# Patient Record
Sex: FEMALE | Race: WHITE | NOT HISPANIC OR LATINO | Employment: OTHER | URBAN - METROPOLITAN AREA
[De-identification: names, ages, dates, MRNs, and addresses within clinical notes are randomized per-mention and may not be internally consistent; named-entity substitution may affect disease eponyms.]

---

## 2019-11-04 LAB
AMPHETAMINE SCREEN, URINE: NORMAL
BARBITURATE SCREEN, URINE: NORMAL
BENZODIAZEPINE SCREEN, URINE: NORMAL
CANNABINOID SCREEN URINE: NORMAL
COCAINE METABOLITE SCREEN URINE: NORMAL
Lab: NORMAL
METHADONE SCREEN, URINE: NORMAL
OPIATE SCREEN, URINE: NORMAL
PHENCYCLIDINE SCREEN URINE: NORMAL

## 2019-11-08 LAB
6-ACETYLMORPHINE, UR: <25 NG/ML
7-AMINOCLONAZEPAM, URINE: <25 NG/ML
ALPHA-HYDROXYALPRAZOLAM, URINE: <25 NG/ML
ALPHA-HYDROXYMIDAZOLAM, URINE: <25 NG/ML
ALPRAZOLAM URINE QUANT: <25 NG/ML
BUPRENORPHINE GLUCURONIDE URINE: <5 NG/ML
BUPRENORPHINE URINE: <2 NG/ML
CHLORDIAZEPOXIDE, URINE: <25 NG/ML
CLONAZEPAM, URINE CONFIRM: <25 NG/ML
CODEINE URINE: <25 NG/ML
DIAZEPAM URINE QUANT: <25 NG/ML
FENTANYL, UR CONF: <2.5 NG/ML
HYDROCODONE, URINE: 83 NG/ML
HYDROMORPHONE, URINE: <25 NG/ML
LORAZEPAM URINE QUANT: <25 NG/ML
MIDAZOLAM URINE QUANT: <25 NG/ML
MORPHINE URINE: <25 NG/ML
N-DESMETHYLTAPENTADOL, URINE: <25 NG/ML
NALOXONE URINE: <100 NG/ML
NORBUPRENORPHINE GLUCURONIDE URINE: <5 NG/ML
NORBUPRENORPHINE, URINE: <2 NG/ML
NORDIAZEPAM URINE QUANT: <25 NG/ML
NORFENTANYL, UR CONF: <2.5 NG/ML
NORHYDROCODONE, URINE: 46 NG/ML
NOROXYCODONE, URINE: <25 NG/ML
O-DESMETHYLTRAMADOL,UR: <25 NG/ML
OXAZEPAM URINE QUANT: <25 NG/ML
OXYCODONE URINE: <25 NG/ML
OXYMORPHONE, URINE: <25 NG/ML
TAPENTADOL, URINE: <25 NG/ML
TEMAZEPAM URINE QUANT: <25 NG/ML
TRAMADOL, UR GC/MS CONF: <25 NG/ML

## 2020-01-06 LAB
6-ACETYLMORPHINE, UR: <25 NG/ML
7-AMINOCLONAZEPAM, URINE: <25 NG/ML
ALPHA-HYDROXYALPRAZOLAM, URINE: <25 NG/ML
ALPHA-HYDROXYMIDAZOLAM, URINE: <25 NG/ML
ALPRAZOLAM URINE QUANT: <25 NG/ML
AMPHETAMINE SCREEN, URINE: ABNORMAL
BARBITURATE SCREEN, URINE: ABNORMAL
CANNABINOID SCREEN URINE: ABNORMAL
CHLORDIAZEPOXIDE, URINE: <25 NG/ML
CLONAZEPAM, URINE CONFIRM: <25 NG/ML
COCAINE METABOLITE SCREEN URINE: ABNORMAL
CODEINE URINE: <25 NG/ML
CREAT SERPL-MCNC: 120.1 MG/DL
DIAZEPAM URINE QUANT: <25 NG/ML
EDDP, URINE: <25 NG/ML
FENTANYL, UR CONF: <2.5 NG/ML
HYDROCODONE, URINE: 2240 NG/ML
HYDROMORPHONE, URINE: 559 NG/ML
LORAZEPAM URINE QUANT: <25 NG/ML
Lab: ABNORMAL
METHADONE, URINE: <25 NG/ML
MIDAZOLAM URINE QUANT: <25 NG/ML
MORPHINE URINE: <25 NG/ML
NORDIAZEPAM URINE QUANT: <25 NG/ML
NORFENTANYL, UR CONF: <2.5 NG/ML
NORHYDROCODONE, URINE: >1000 NG/ML
NOROXYCODONE, URINE: <25 NG/ML
O-DESMETHYLTRAMADOL,UR: <25 NG/ML
OXAZEPAM URINE QUANT: <25 NG/ML
OXYCODONE URINE: <25 NG/ML
OXYMORPHONE, URINE: <25 NG/ML
PHENCYCLIDINE SCREEN URINE: ABNORMAL
TEMAZEPAM URINE QUANT: <25 NG/ML
TRAMADOL, UR GC/MS CONF: <25 NG/ML
ZOLPIDEM METABOLITE (ZCA), URINE: <25 NG/ML
ZOLPIDEM, URINE: <25 NG/ML

## 2023-05-23 PROBLEM — G89.29 ABDOMINAL PAIN, CHRONIC, LEFT LOWER QUADRANT: Status: ACTIVE | Noted: 2023-05-23

## 2023-05-23 PROBLEM — I10 BENIGN ESSENTIAL HYPERTENSION: Status: ACTIVE | Noted: 2023-05-23

## 2023-05-23 PROBLEM — B02.9 SHINGLES: Status: ACTIVE | Noted: 2023-05-23

## 2023-05-23 PROBLEM — H53.8 BLURRED VISION: Status: ACTIVE | Noted: 2023-05-23

## 2023-05-23 PROBLEM — R29.898 LEFT ARM WEAKNESS: Status: ACTIVE | Noted: 2023-05-23

## 2023-05-23 PROBLEM — M75.42 IMPINGEMENT SYNDROME OF LEFT SHOULDER: Status: ACTIVE | Noted: 2023-05-23

## 2023-05-23 PROBLEM — G47.00 INSOMNIA: Status: ACTIVE | Noted: 2023-05-23

## 2023-05-23 PROBLEM — R26.89 BALANCE PROBLEM: Status: ACTIVE | Noted: 2023-05-23

## 2023-05-23 PROBLEM — F41.9 ANXIETY AND DEPRESSION: Status: ACTIVE | Noted: 2023-05-23

## 2023-05-23 PROBLEM — M62.838 MUSCLE SPASM: Status: ACTIVE | Noted: 2023-05-23

## 2023-05-23 PROBLEM — H10.13 ALLERGIC CONJUNCTIVITIS OF BOTH EYES: Status: ACTIVE | Noted: 2023-05-23

## 2023-05-23 PROBLEM — E55.9 VITAMIN D DEFICIENCY: Status: ACTIVE | Noted: 2023-05-23

## 2023-05-23 PROBLEM — M19.90 ARTHRITIS: Status: ACTIVE | Noted: 2023-05-23

## 2023-05-23 PROBLEM — R07.9 CHEST PAIN: Status: ACTIVE | Noted: 2023-05-23

## 2023-05-23 PROBLEM — G62.9 PERIPHERAL NEUROPATHY: Status: ACTIVE | Noted: 2023-05-23

## 2023-05-23 PROBLEM — R73.9 HYPERGLYCEMIA: Status: ACTIVE | Noted: 2023-05-23

## 2023-05-23 PROBLEM — R94.31 ABNORMAL EKG: Status: ACTIVE | Noted: 2023-05-23

## 2023-05-23 PROBLEM — G43.909 HEADACHE, MIGRAINE: Status: ACTIVE | Noted: 2023-05-23

## 2023-05-23 PROBLEM — H00.019 STYE: Status: ACTIVE | Noted: 2023-05-23

## 2023-05-23 PROBLEM — S92.034A CLOSED NONDISPLACED AVULSION FRACTURE OF TUBEROSITY OF RIGHT CALCANEUS: Status: ACTIVE | Noted: 2023-05-23

## 2023-05-23 PROBLEM — R10.32 ABDOMINAL PAIN, CHRONIC, LEFT LOWER QUADRANT: Status: ACTIVE | Noted: 2023-05-23

## 2023-05-23 PROBLEM — M43.06 LUMBAR SPONDYLOLYSIS: Status: ACTIVE | Noted: 2023-05-23

## 2023-05-23 PROBLEM — G35 MULTIPLE SCLEROSIS, RELAPSING-REMITTING (MULTI): Status: ACTIVE | Noted: 2023-05-23

## 2023-05-23 PROBLEM — T63.91XA ALLERGY OR TOXIC REACTION TO VENOM: Status: ACTIVE | Noted: 2023-05-23

## 2023-05-23 PROBLEM — M75.82 ROTATOR CUFF TENDINITIS, LEFT: Status: ACTIVE | Noted: 2023-05-23

## 2023-05-23 PROBLEM — G47.30 APNEA, SLEEP: Status: ACTIVE | Noted: 2023-05-23

## 2023-05-23 PROBLEM — E11.65 TYPE 2 DIABETES MELLITUS WITH HYPERGLYCEMIA, WITHOUT LONG-TERM CURRENT USE OF INSULIN (MULTI): Status: ACTIVE | Noted: 2023-05-23

## 2023-05-23 PROBLEM — M77.11 LATERAL EPICONDYLITIS OF RIGHT ELBOW: Status: ACTIVE | Noted: 2023-05-23

## 2023-05-23 PROBLEM — T75.3XXA MOTION SICKNESS: Status: ACTIVE | Noted: 2023-05-23

## 2023-05-23 PROBLEM — R13.10 DYSPHAGIA: Status: ACTIVE | Noted: 2023-05-23

## 2023-05-23 PROBLEM — J30.9 ALLERGIC RHINITIS: Status: ACTIVE | Noted: 2023-05-23

## 2023-05-23 PROBLEM — B35.6 TINEA CRURIS: Status: ACTIVE | Noted: 2023-05-23

## 2023-05-23 PROBLEM — M75.52 BURSITIS OF LEFT SHOULDER: Status: ACTIVE | Noted: 2023-05-23

## 2023-05-23 PROBLEM — K21.9 GERD WITHOUT ESOPHAGITIS: Status: ACTIVE | Noted: 2023-05-23

## 2023-05-23 PROBLEM — F17.210 CIGARETTE NICOTINE DEPENDENCE WITHOUT COMPLICATION: Status: ACTIVE | Noted: 2023-05-23

## 2023-05-23 PROBLEM — F32.A ANXIETY AND DEPRESSION: Status: ACTIVE | Noted: 2023-05-23

## 2023-05-23 PROBLEM — M19.012 ARTHRITIS OF LEFT ACROMIOCLAVICULAR JOINT: Status: ACTIVE | Noted: 2023-05-23

## 2023-05-23 PROBLEM — F45.8 FUNCTIONAL VISUAL LOSS: Status: ACTIVE | Noted: 2023-05-23

## 2023-05-23 PROBLEM — R20.9 DISTURBANCE OF SKIN SENSATION: Status: ACTIVE | Noted: 2023-05-23

## 2023-05-23 PROBLEM — R91.1 SOLITARY PULMONARY NODULE: Status: ACTIVE | Noted: 2023-05-23

## 2023-05-23 PROBLEM — M75.22 TENDINITIS OF UPPER BICEPS TENDON OF LEFT SHOULDER: Status: ACTIVE | Noted: 2023-05-23

## 2023-05-23 PROBLEM — S53.401A ELBOW SPRAIN, RIGHT, INITIAL ENCOUNTER: Status: ACTIVE | Noted: 2023-05-23

## 2023-05-23 PROBLEM — M54.16 LUMBAR RADICULOPATHY: Status: ACTIVE | Noted: 2023-05-23

## 2023-05-23 PROBLEM — M54.50 ACUTE EXACERBATION OF CHRONIC LOW BACK PAIN: Status: ACTIVE | Noted: 2023-05-23

## 2023-05-23 PROBLEM — K59.09 CHRONIC CONSTIPATION: Status: ACTIVE | Noted: 2023-05-23

## 2023-05-23 PROBLEM — R29.6 FALLS: Status: ACTIVE | Noted: 2023-05-23
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
## 2023-05-23 PROBLEM — E78.2 COMBINED HYPERLIPIDEMIA: Status: ACTIVE | Noted: 2023-05-23

## 2023-05-23 PROBLEM — M76.32 ILIOTIBIAL BAND SYNDROME OF LEFT SIDE: Status: ACTIVE | Noted: 2023-05-23

## 2023-05-23 PROBLEM — R53.81 PHYSICAL DEBILITY: Status: ACTIVE | Noted: 2023-05-23

## 2023-05-23 PROBLEM — G89.29 ACUTE EXACERBATION OF CHRONIC LOW BACK PAIN: Status: ACTIVE | Noted: 2023-05-23

## 2023-05-23 PROBLEM — M75.122 NONTRAUMATIC COMPLETE TEAR OF LEFT ROTATOR CUFF: Status: ACTIVE | Noted: 2023-05-23

## 2023-05-23 PROBLEM — M25.521 ELBOW PAIN, RIGHT: Status: ACTIVE | Noted: 2023-05-23

## 2023-05-23 PROBLEM — R94.39 ABNORMAL STRESS ELECTROCARDIOGRAM TEST: Status: ACTIVE | Noted: 2023-05-23

## 2023-05-23 PROBLEM — H00.025 HORDEOLUM INTERNUM OF LEFT LOWER EYELID: Status: ACTIVE | Noted: 2023-05-23

## 2023-05-23 PROBLEM — H57.89 EYE SWELLING, LEFT: Status: ACTIVE | Noted: 2023-05-23

## 2023-05-23 PROBLEM — E03.9 HYPOTHYROIDISM: Status: ACTIVE | Noted: 2023-05-23

## 2023-05-23 PROBLEM — H65.91 RIGHT SEROUS OTITIS MEDIA: Status: ACTIVE | Noted: 2023-05-23

## 2023-05-23 PROBLEM — M75.102 ROTATOR CUFF TEAR, LEFT: Status: ACTIVE | Noted: 2023-05-23

## 2023-10-24 DIAGNOSIS — M25.522 LEFT ELBOW PAIN: ICD-10-CM

## 2023-11-01 ENCOUNTER — LAB (OUTPATIENT)
Dept: LAB | Facility: LAB | Age: 48
End: 2023-11-01
Payer: COMMERCIAL

## 2023-11-01 ENCOUNTER — HOSPITAL ENCOUNTER (OUTPATIENT)
Dept: RADIOLOGY | Facility: HOSPITAL | Age: 48
Discharge: HOME | End: 2023-11-01
Payer: COMMERCIAL

## 2023-11-01 PROBLEM — K61.1 PERIRECTAL ABSCESS: Status: ACTIVE | Noted: 2023-11-01

## 2023-11-01 PROBLEM — G40.909 EPILEPSY, UNSPECIFIED, NOT INTRACTABLE, WITHOUT STATUS EPILEPTICUS (MULTI): Status: ACTIVE | Noted: 2020-09-09

## 2023-11-01 PROBLEM — F32.A: Status: ACTIVE | Noted: 2023-11-01

## 2023-11-01 PROBLEM — G35: Status: ACTIVE | Noted: 2023-11-01

## 2023-11-01 PROBLEM — F31.9 BIPOLAR DISORDER (MULTI): Status: ACTIVE | Noted: 2020-09-09

## 2023-11-01 PROBLEM — M47.26 OTHER SPONDYLOSIS WITH RADICULOPATHY, LUMBAR REGION: Status: ACTIVE | Noted: 2020-09-09

## 2023-11-01 PROBLEM — M51.379 DEGENERATION OF LUMBOSACRAL INTERVERTEBRAL DISC: Status: ACTIVE | Noted: 2023-11-01

## 2023-11-01 PROBLEM — M54.2 CERVICALGIA: Status: ACTIVE | Noted: 2020-09-09

## 2023-11-01 PROBLEM — G81.91 RIGHT HEMIPARESIS (MULTI): Status: ACTIVE | Noted: 2023-11-01

## 2023-11-01 PROBLEM — D84.9 IMMUNOCOMPROMISED, ACQUIRED (MULTI): Status: ACTIVE | Noted: 2023-11-01

## 2023-11-01 PROBLEM — F41.9 ANXIETY DISORDER, UNSPECIFIED: Status: ACTIVE | Noted: 2020-09-09

## 2023-11-01 PROBLEM — M79.7 FIBROMYALGIA: Status: ACTIVE | Noted: 2020-09-09

## 2023-11-01 PROBLEM — G62.9 POLYNEUROPATHY: Status: ACTIVE | Noted: 2020-09-09

## 2023-11-01 PROBLEM — D72.829 LEUKOCYTOSIS: Status: ACTIVE | Noted: 2023-11-01

## 2023-11-01 PROBLEM — M25.519 SHOULDER JOINT PAIN: Status: ACTIVE | Noted: 2023-11-01

## 2023-11-01 PROBLEM — H46.9 OPTIC NEURITIS: Status: ACTIVE | Noted: 2020-08-27

## 2023-11-01 PROBLEM — E03.9 HYPOTHYROIDISM, UNSPECIFIED: Status: ACTIVE | Noted: 2020-09-09

## 2023-11-01 PROBLEM — M51.37 DEGENERATION OF LUMBOSACRAL INTERVERTEBRAL DISC: Status: ACTIVE | Noted: 2023-11-01

## 2023-11-01 PROBLEM — I10 HYPERTENSIVE DISORDER: Status: ACTIVE | Noted: 2023-11-01

## 2023-11-01 PROBLEM — J45.909 ASTHMA (HHS-HCC): Status: ACTIVE | Noted: 2020-09-09

## 2023-11-01 PROBLEM — K52.9 GASTROENTERITIS: Status: ACTIVE | Noted: 2023-11-01

## 2023-11-01 PROBLEM — M47.817 LUMBOSACRAL SPONDYLOSIS WITHOUT MYELOPATHY: Status: ACTIVE | Noted: 2023-11-01

## 2023-11-01 PROBLEM — M51.9 INTERVERTEBRAL DISC DISORDER: Status: ACTIVE | Noted: 2020-09-09

## 2023-11-01 PROBLEM — I10 ESSENTIAL (PRIMARY) HYPERTENSION: Status: ACTIVE | Noted: 2020-09-09

## 2023-11-01 PROBLEM — R11.0 NAUSEA: Status: ACTIVE | Noted: 2023-11-01

## 2023-11-01 PROBLEM — H54.7 VISUAL LOSS: Status: ACTIVE | Noted: 2020-08-27

## 2023-11-01 PROBLEM — M19.90 OSTEOARTHRITIS: Status: ACTIVE | Noted: 2020-09-09

## 2023-11-01 PROBLEM — M21.371 RIGHT FOOT DROP: Status: ACTIVE | Noted: 2020-09-09

## 2023-11-01 PROBLEM — G47.30 SLEEP APNEA: Status: ACTIVE | Noted: 2020-09-09

## 2023-11-01 PROBLEM — Z91.81 HISTORY OF FALLING: Status: ACTIVE | Noted: 2020-09-09

## 2023-11-01 PROBLEM — Z79.891 LONG TERM (CURRENT) USE OF OPIATE ANALGESIC: Status: ACTIVE | Noted: 2020-09-09

## 2023-11-01 PROBLEM — G43.909 MIGRAINE, UNSPECIFIED, NOT INTRACTABLE, WITHOUT STATUS MIGRAINOSUS: Status: ACTIVE | Noted: 2020-09-09

## 2023-11-01 PROCEDURE — 73080 X-RAY EXAM OF ELBOW: CPT | Mod: LEFT SIDE | Performed by: RADIOLOGY

## 2023-11-01 PROCEDURE — 73080 X-RAY EXAM OF ELBOW: CPT | Mod: LT,FY

## 2023-11-01 RX ORDER — FINGOLIMOD HYDROCHLORIDE 0.5 MG/1
0.5 CAPSULE ORAL
COMMUNITY
Start: 2017-01-06 | End: 2023-11-08 | Stop reason: ALTCHOICE

## 2023-11-01 RX ORDER — ARIPIPRAZOLE 5 MG/1
5 TABLET ORAL NIGHTLY
COMMUNITY
End: 2024-05-21 | Stop reason: WASHOUT

## 2023-11-01 RX ORDER — CLONAZEPAM 0.5 MG/1
0.5 TABLET ORAL
COMMUNITY
Start: 2017-01-06 | End: 2023-11-02 | Stop reason: ALTCHOICE

## 2023-11-01 RX ORDER — TRAZODONE HYDROCHLORIDE 50 MG/1
1 TABLET ORAL DAILY
COMMUNITY

## 2023-11-01 RX ORDER — IBUPROFEN 200 MG
1 TABLET ORAL DAILY
COMMUNITY
End: 2023-11-02 | Stop reason: WASHOUT

## 2023-11-01 RX ORDER — PROMETHAZINE HYDROCHLORIDE 25 MG/1
25 TABLET ORAL 4 TIMES DAILY PRN
COMMUNITY
Start: 2023-07-27 | End: 2023-11-02 | Stop reason: WASHOUT

## 2023-11-01 RX ORDER — LEVOFLOXACIN 500 MG/1
500 TABLET, FILM COATED ORAL
COMMUNITY
End: 2023-11-02 | Stop reason: WASHOUT

## 2023-11-01 RX ORDER — OXYMORPHONE HYDROCHLORIDE 15 MG/1
TABLET, FILM COATED, EXTENDED RELEASE ORAL 2 TIMES DAILY
COMMUNITY
End: 2023-11-08 | Stop reason: ALTCHOICE

## 2023-11-01 RX ORDER — ZOLPIDEM TARTRATE 5 MG/1
5 TABLET ORAL NIGHTLY
COMMUNITY
Start: 2020-09-04 | End: 2023-11-02 | Stop reason: WASHOUT

## 2023-11-01 RX ORDER — OMEPRAZOLE 20 MG/1
20 CAPSULE, DELAYED RELEASE ORAL DAILY
COMMUNITY
Start: 2009-10-06 | End: 2023-11-02 | Stop reason: WASHOUT

## 2023-11-01 RX ORDER — PEN NEEDLE, DIABETIC 30 GX3/16"
NEEDLE, DISPOSABLE MISCELLANEOUS
COMMUNITY
End: 2023-11-02 | Stop reason: WASHOUT

## 2023-11-01 RX ORDER — TALC
6 POWDER (GRAM) TOPICAL NIGHTLY
COMMUNITY
Start: 2020-09-08 | End: 2023-11-08 | Stop reason: ALTCHOICE

## 2023-11-01 RX ORDER — DULOXETIN HYDROCHLORIDE 60 MG/1
60 CAPSULE, DELAYED RELEASE ORAL DAILY
COMMUNITY
End: 2024-05-21 | Stop reason: SDUPTHER

## 2023-11-01 RX ORDER — OXYCODONE AND ACETAMINOPHEN 5; 325 MG/1; MG/1
TABLET ORAL
COMMUNITY
End: 2023-11-02 | Stop reason: WASHOUT

## 2023-11-01 RX ORDER — ZIPRASIDONE HYDROCHLORIDE 20 MG/1
20 CAPSULE ORAL DAILY
COMMUNITY
End: 2023-11-02 | Stop reason: WASHOUT

## 2023-11-01 RX ORDER — VENLAFAXINE HYDROCHLORIDE 150 MG/1
150 CAPSULE, EXTENDED RELEASE ORAL
COMMUNITY
Start: 2017-01-06 | End: 2023-11-02 | Stop reason: WASHOUT

## 2023-11-01 RX ORDER — TOPIRAMATE 50 MG/1
75 TABLET, FILM COATED ORAL 2 TIMES DAILY
COMMUNITY
Start: 2020-09-08 | End: 2023-11-08 | Stop reason: ALTCHOICE

## 2023-11-01 RX ORDER — CLONAZEPAM 1 MG/1
1 TABLET ORAL DAILY
COMMUNITY
End: 2023-11-08 | Stop reason: ALTCHOICE

## 2023-11-01 RX ORDER — CIPROFLOXACIN 500 MG/1
500 TABLET ORAL 2 TIMES DAILY
COMMUNITY
End: 2023-11-08 | Stop reason: ALTCHOICE

## 2023-11-01 RX ORDER — TOPIRAMATE 100 MG/1
100 TABLET, FILM COATED ORAL 2 TIMES DAILY
COMMUNITY
End: 2023-11-08 | Stop reason: ALTCHOICE

## 2023-11-01 RX ORDER — METRONIDAZOLE 500 MG/1
500 TABLET ORAL 3 TIMES DAILY
COMMUNITY
End: 2023-11-08 | Stop reason: ALTCHOICE

## 2023-11-01 RX ORDER — INTERFERON BETA-1A 44 UG/.5ML
INJECTION, SOLUTION SUBCUTANEOUS 3 TIMES WEEKLY
COMMUNITY
End: 2023-11-02 | Stop reason: WASHOUT

## 2023-11-01 RX ORDER — DOCUSATE SODIUM 100 MG/1
100 CAPSULE, LIQUID FILLED ORAL 2 TIMES DAILY
COMMUNITY
Start: 2020-09-08 | End: 2023-11-08 | Stop reason: ALTCHOICE

## 2023-11-01 RX ORDER — GABAPENTIN 300 MG/1
300 CAPSULE ORAL 2 TIMES DAILY
COMMUNITY
End: 2023-11-02 | Stop reason: ALTCHOICE

## 2023-11-01 RX ORDER — LEVOTHYROXINE SODIUM 25 UG/1
TABLET ORAL
COMMUNITY

## 2023-11-01 RX ORDER — DIAZEPAM 2 MG/1
2 TABLET ORAL NIGHTLY
COMMUNITY
End: 2023-11-02 | Stop reason: ALTCHOICE

## 2023-11-01 RX ORDER — HYDROCODONE BITARTRATE AND ACETAMINOPHEN 5; 325 MG/1; MG/1
1 TABLET ORAL EVERY 6 HOURS PRN
COMMUNITY
Start: 2020-09-02 | End: 2023-11-02 | Stop reason: WASHOUT

## 2023-11-02 ENCOUNTER — HOSPITAL ENCOUNTER (OUTPATIENT)
Dept: RADIOLOGY | Facility: HOSPITAL | Age: 48
Discharge: HOME | End: 2023-11-02
Payer: COMMERCIAL

## 2023-11-02 ENCOUNTER — OFFICE VISIT (OUTPATIENT)
Dept: ORTHOPEDIC SURGERY | Facility: CLINIC | Age: 48
End: 2023-11-02
Payer: COMMERCIAL

## 2023-11-02 VITALS — WEIGHT: 186 LBS | HEIGHT: 69 IN | BODY MASS INDEX: 27.55 KG/M2

## 2023-11-02 DIAGNOSIS — M25.522 LEFT ELBOW PAIN: ICD-10-CM

## 2023-11-02 DIAGNOSIS — M77.12 LATERAL EPICONDYLITIS OF LEFT ELBOW: Primary | ICD-10-CM

## 2023-11-02 PROCEDURE — 3078F DIAST BP <80 MM HG: CPT | Performed by: ORTHOPAEDIC SURGERY

## 2023-11-02 PROCEDURE — 20551 NJX 1 TENDON ORIGIN/INSJ: CPT | Performed by: ORTHOPAEDIC SURGERY

## 2023-11-02 PROCEDURE — 99214 OFFICE O/P EST MOD 30 MIN: CPT | Performed by: ORTHOPAEDIC SURGERY

## 2023-11-02 PROCEDURE — 3074F SYST BP LT 130 MM HG: CPT | Performed by: ORTHOPAEDIC SURGERY

## 2023-11-02 ASSESSMENT — PAIN SCALES - GENERAL: PAINLEVEL_OUTOF10: 7

## 2023-11-02 ASSESSMENT — PAIN - FUNCTIONAL ASSESSMENT: PAIN_FUNCTIONAL_ASSESSMENT: 0-10

## 2023-11-02 NOTE — PROGRESS NOTES
48 y.o. female presents today for evaluation of left lateral sided elbow pain. The patient reports symptoms for weeks, getting worse. Pain is controlled. Patient reports no numbness and tingling.  Reports no previous surgeries, injections, or trauma to the area.  Reports pain worse with use, better at rest.  Pain dull ache, sharp at times. Had TOPAZ on right in past, doing well.    Review of Systems    Constitutional: no fever, no chills, not feeling tired, no recent weight gain and no recent weight loss.   ENT: no nosebleeds.   Cardiovascular: no chest pain.   Respiratory: no shortness of breath and no cough.   Gastrointestinal: no abdominal pain, no nausea, no vomiting and no diarrhea.   Musculoskeletal: per HPI  Integumentary: no rashes and no skin wound.   Neurological: no headache.   Psychiatric: no depression and no sleep disturbances.   Endocrine: no muscle weakness and no muscle cramps.   Hematologic/Lymphatic: no swollen glands and no tendency for easy bruising.     All other systems have been reviewed and are negative for complaint    Patient's past medical history, past surgical history, allergies, and medications have been reviewed unless otherwise noted in the chart.     Lateral Epicondylitis  Inspection:  no evidence of infection, no erythema, no edema, Palpation:  compartments are soft, positive pain over the lateral epicondyle, Range of Motion:  full elbow flexion, 20 from full extension Stability:  no elbow instability noted, Strength:  5/5 elbow flexion/extension, Skin:  intact, Vascular:  capillary refill <2 seconds distally, Sensation:  intact to light touch distally, Tests:  pain with resisted wrist extension over lateral epicondyle.      Constitutional   General appearance: Alert and in no acute distress. Well developed, well nourished.    Eyes   External Eye, Conjunctiva and lids: Normal external exam - pupils were equal in size, round, reactive to light (PERRL) with normal accommodation and  extraocular movements intact (EOMI).   Ears, Nose, Mouth, and Throat   Hearing: Normal.   Neck   Neck: No neck mass was observed. Supple.   Pulmonary   Respiratory effort: No respiratory distress.   Cardiovascular   Examination of extremities: No peripheral edema.   Abdomen   Abdomen: Soft nontender; no abdominal mass palpated.. No rebound, rigidity or guarding.    Skin   Skin and subcutaneous tissue: Normal skin color and pigmentation, normal skin turgor, and no rash.   Neurologic   Sensation: Normal.   Psychiatric   Judgment and insight: Intact.   Orientation to person, place, and time: Alert and oriented x 3.       Mood and affect: Normal.      Left lateral epicondylitis  I discussed the diagnosis and treatment options with the patient today along with their associated risks and benefits. After thorough discussion, the patient has elected to proceed with conservative management. All questions were answered to the patients satisfaction who seems satisfied with the plan.      Discussion I discussed the diagnosis and treatment options with the patient today along with their associated risks and benefits. After thorough discussion, the patient has elected to proceed with a corticosteroid injection with Kenalog and Xylocaine, which was performed in the office today under aseptic technique and the patient tolerated the procedure well.          Ortho Injections:           Injection site left lateral epicondyle. Medication 1 cc 1% Xylocaine, 1 cc 10 mg Kenalog, Injection given under sterile conditions. No immediate complications noted. Post injection instructions given.  Voltaren gel prn  Handmaster plus  Band-it  FU 8 weeks - No XR

## 2023-11-02 NOTE — PROGRESS NOTES
Sara Uribe is here for new left elbow pain. Xrays done yesterday. Pain stated 3 weeks ago with no known injury. She states that she woke up and she could not straighten her elbow all the way.     Previous right topaz 2020 and right elbow sprain 3/2023

## 2023-11-08 ENCOUNTER — TELEMEDICINE (OUTPATIENT)
Dept: NEUROLOGY | Facility: HOSPITAL | Age: 48
End: 2023-11-08
Payer: COMMERCIAL

## 2023-11-08 DIAGNOSIS — F45.8 FUNCTIONAL VISUAL LOSS: ICD-10-CM

## 2023-11-08 DIAGNOSIS — R42 DIZZINESS: ICD-10-CM

## 2023-11-08 DIAGNOSIS — G35 MULTIPLE SCLEROSIS, RELAPSING-REMITTING (MULTI): Primary | ICD-10-CM

## 2023-11-08 DIAGNOSIS — G43.709 CHRONIC MIGRAINE WITHOUT AURA WITHOUT STATUS MIGRAINOSUS, NOT INTRACTABLE: ICD-10-CM

## 2023-11-08 DIAGNOSIS — Z79.899 POLYPHARMACY: ICD-10-CM

## 2023-11-08 PROCEDURE — 99214 OFFICE O/P EST MOD 30 MIN: CPT | Mod: GT,95 | Performed by: PSYCHIATRY & NEUROLOGY

## 2023-11-08 PROCEDURE — 99214 OFFICE O/P EST MOD 30 MIN: CPT | Performed by: PSYCHIATRY & NEUROLOGY

## 2023-11-08 RX ORDER — SUMATRIPTAN 50 MG/1
50 TABLET, FILM COATED ORAL ONCE AS NEEDED
Qty: 9 TABLET | Refills: 2 | Status: SHIPPED | OUTPATIENT
Start: 2023-11-08 | End: 2024-02-06

## 2023-11-08 NOTE — PROGRESS NOTES
"Telehealth visit    Visit type: Virtual follow-up    PCP: Julia Molina MD.    Subjective     Sara Uribe is a 48 y.o. year old female here for virtual follow-up. Last seen 4/5/23.    Patient is accompanied by: Other none .     Telehealth visit today. The patient was informed about the telehealth clinical encounter including benefits to avoiding travel, limitations of the assessment, and billing for the service. In-office care may be recommended if needed. Telehealth sessions are not being recorded and personal health information is protected. All questions were answered and verbal consent from the patient (or guardian) was obtained to proceed. Patient verbally confirmed, patient physically in [place].       HPI    Hx diagnosed MS who I first saw 2018. Most recently on Gilenya. Having \"MS attacks\" but atypical--no evidence that they were MS attacks. Recent MRI done 9/2018 during attack hospitalization showed no enhancing lesions. I saw her on 9/25/18. Previous neuro started her on Tecfidera. Had itching (allergy), stopped. She was to get EMG/NCT for RLE/BUE but took a while due to no transportation. Switched to Aubagio 1/2019 after discussion of side effects and monitoring needed. Still with HA. Having BLE pains. Also numbness in UE. I lowered her topiramate to 50mg bid as HA control improved, to help with polypharmacy. Was hospitalized May 2019 for observation at [facility] [place] ED for loss of consciousness, brief per notes. Thought to be due to hypovolemia.    7/9/19 EMG/NCT (Dr Crowe): R ulnar neuropathy, site unable to localized, mild; no bilat CTS, no L cervical radiculopathy  7/10/19 EMG/NCT (Dr CUNNINGHAM): normal BLE, dec recruitment likely due to UMN disease  7/2/19 PSG (BMI 32.5): no SDB, EDS, with snoring; overall AHI 0    Was doing well on Aubagio, no attacks. Pt hospitalized end Aug 2020 for R eye blindness and RUE movements. RUE movements were very atypical and distractible. MRI brain/orbit wwo initially " "reported with no optic neuritis. Repeat MRI orbit had ? fluid in R optic nerve. 3 days IV solumedrol started. Pt's vision did not improve. NMO antibody checked and was negative. Pt discharged to rehab. We discussed about possibly seeing MS specialist for consideration of other medication other than Aubagio. She wants to consider Emgality for headaches, and to continue topiramate and triptan. She was seeing Dr. Fernandes, neurologist at [place], apparently because \"she did not see eye to eye\" with me. Of note, patient previously tried to see Dr. Crowe but she would not see her. She had also seen Dr. Taylor, neuro-ophthalmologist, who diagnosed functional visual loss, and recommended mental health treatment. States Emgality helping with HA and to continue meds including Aubagio. Pt stopped Emgality as HA was better. To continue Aubagio.     Here today for follow-up.    Has been doing ok. Been doing pretty good. No MS attacks. Was to go for surgery for L elbow (arthritis) soon.    HA not that often. Just once in a while. No known heart issues.    Once in a while, while walking, staggering to R. Mom and GF noticed it. Feel dizzy. Been happening more than usual. I had previously evaluated her for falls with normal BLE EMG/NCT.    Still on multiple psych meds and psychoactive medications.     MS meds:  Rebif - can't remember when taken and for how long (discontinued due to body rejecting)  Avonex - can't remember when taken and for how long (discontinued due to not working)  Roqueenya - taken for years, \"had too many attacks\", taken off Nov 2017  Was supposed to be on infusion but never happened because of insurance denial  Tecfidera - 9/25/18-10/18/18 (I asked her to stop due to allergy)  Aubagio - early Jan 2019 to present     Patient Active Problem List   Diagnosis    Abdominal pain, chronic, left lower quadrant    Abnormal EKG    Abnormal stress electrocardiogram test    Allergic conjunctivitis of both eyes    Allergic " rhinitis    Allergy or toxic reaction to venom    Anxiety and depression    Arthritis    Arthritis of left acromioclavicular joint    Balance problem    Benign essential hypertension    Blurred vision    Bursitis of left shoulder    Chronic constipation    Cigarette nicotine dependence without complication    Closed nondisplaced avulsion fracture of tuberosity of right calcaneus    Combined hyperlipidemia    Disturbance of skin sensation    Dysphagia    Elbow pain, right    Elbow sprain, right, initial encounter    Eye swelling, left    Falls    Functional visual loss    GERD without esophagitis    Chronic migraine without aura without status migrainosus, not intractable    Hordeolum internum of left lower eyelid    Hyperglycemia    Hypothyroidism    Iliotibial band syndrome of left side    Impingement syndrome of left shoulder    Insomnia    Lateral epicondylitis of right elbow    Left arm weakness    Chest pain    Lumbar radiculopathy    Lumbar spondylolysis    Motion sickness    Multiple sclerosis, relapsing-remitting (CMS/HCC)    Muscle spasm    Nontraumatic complete tear of left rotator cuff    Peripheral neuropathy    Physical debility    Right serous otitis media    Rotator cuff tear, left    Shingles    Solitary pulmonary nodule    Rotator cuff tendinitis, left    Tendinitis of upper biceps tendon of left shoulder    Tinea cruris    Type 2 diabetes mellitus with hyperglycemia, without long-term current use of insulin (CMS/HCC)    Vitamin D deficiency    Nausea    Shoulder joint pain    Right hemiparesis (CMS/HCC)    Perirectal abscess    Lumbosacral spondylosis without myelopathy    Degeneration of lumbosacral intervertebral disc    Leukocytosis    Immunocompromised, acquired (CMS/HCC)    Hypertensive disorder    Gastroenteritis    Osteoarthritis    Sleep apnea    Right foot drop    Optic neuritis    Asthma    Intervertebral disc disorder    Polyneuropathy    Cervicalgia    Essential (primary) hypertension     Other spondylosis with radiculopathy, lumbar region    Fibromyalgia    Bipolar disorder (CMS/HCC)    Migraine, unspecified, not intractable, without status migrainosus    Anxiety disorder, unspecified    Long term (current) use of opiate analgesic    History of falling    Epilepsy, unspecified, not intractable, without status epilepticus (CMS/HCC)    BMI 33.0-33.9,adult    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)    Polypharmacy    Dizziness       Allergies   Allergen Reactions    Bee Venom Protein (Honey Bee) Anaphylaxis, Hives and Swelling    Latex Hives, Unknown and Rash    Dimethyl Fumarate Unknown       Current Outpatient Medications:     albuterol 90 mcg/actuation inhaler, INHALE 2 PUFFS BY MOUTH EVERY 6 HOURS AS NEEDED, Disp: 8.5 g, Rfl: 0    ARIPiprazole (Abilify) 5 mg tablet, , Disp: , Rfl:     aspirin 81 mg EC tablet, Take by mouth twice a day., Disp: , Rfl:     atorvastatin (Lipitor) 40 mg tablet, Take 1 tablet (40 mg) by mouth once daily at bedtime., Disp: , Rfl:     baclofen (Lioresal) 10 mg tablet, Take 1 tablet (10 mg) by mouth 2 times a day., Disp: , Rfl:     blood sugar diagnostic (True Metrix Glucose Test Strip) strip, CHECK BLOOD GLUCOSE once daily, Disp: , Rfl:     buPROPion XL (Wellbutrin XL) 150 mg 24 hr tablet, Take 2 tablets (300 mg) by mouth once daily., Disp: , Rfl:     cyclobenzaprine (Flexeril) 10 mg tablet, Take by mouth., Disp: , Rfl:     dapagliflozin (Farxiga) 5 mg, Take 1 tablet (5 mg) by mouth once daily in the morning. Take before meals., Disp: , Rfl:     dexlansoprazole (Dexilant) 60 mg DR capsule, Take 1 capsule (60 mg) by mouth once daily., Disp: , Rfl:     DULoxetine (Cymbalta) 60 mg DR capsule, Take 1 capsule (60 mg) by mouth once daily., Disp: , Rfl:     lamoTRIgine (LaMICtal) 150 mg tablet, Take 1 tablet (150 mg) by mouth once daily at bedtime., Disp: , Rfl:     levothyroxine (LevoxyL) 25 mcg tablet, Take by mouth once daily in the morning. Take before meals., Disp:  , Rfl:     melatonin 5 mg tablet, Take 1 tablet (5 mg) by mouth as needed at bedtime., Disp: , Rfl:     meloxicam (Mobic) 15 mg tablet, Take 1 tablet (15 mg) by mouth once daily., Disp: , Rfl:     metFORMIN XR (Glucophage-XR) 750 mg 24 hr tablet, Take 1 tablet (750 mg) by mouth 2 times a day., Disp: , Rfl:     naproxen (Naprosyn) 500 mg tablet, Take by mouth twice a day., Disp: , Rfl:     orphenadrine (Norflex) 100 mg 12 hr tablet, Take by mouth twice a day., Disp: , Rfl:     Rexulti 0.5 mg tablet, take 1 tablet by mouth at bedtime, Disp: 90 tablet, Rfl: 0    teriflunomide (Aubagio) 14 mg tablet tablet, Take 1 tablet (14 mg) by mouth once daily., Disp: , Rfl:     tiZANidine (Zanaflex) 4 mg tablet, Take 1 tablet (4 mg) by mouth 3 times a day., Disp: , Rfl:     traZODone (Desyrel) 50 mg tablet, Take 1 tablet (50 mg) by mouth once daily., Disp: , Rfl:     vortioxetine (Trintellix) 20 mg tablet tablet, Take 1 tablet (20 mg) by mouth once daily., Disp: , Rfl:     SUMAtriptan (Imitrex) 50 mg tablet, Take 1 tablet (50 mg) by mouth 1 time if needed for migraine (MAY REPEAT DOSE IN 2 HOURS.  MAX 2 TABS IN 24 HOURS)., Disp: 9 tablet, Rfl: 2     Objective     There were no vitals taken for this visit.       Awake, alert, oriented x3, in no distress  Well-nourished, seated    Mental status exam as above, conversant   Full EOMs intact, no nystagmus, no ptosis   No facial droop   Hearing grossly intact   No dysarthria    Motor strength is at least antigravity on all extremities  I did not have her stand or walk          Assessment/Plan   Problem List Items Addressed This Visit             ICD-10-CM    Functional visual loss F45.8     ? etiology of stated chronic left eye blindness  Had seen Dr. Taylor, neuro-ophthalmologist--diagnosed functional visual loss         Chronic migraine without aura without status migrainosus, not intractable G43.709     IMPROVED  Strange on again, off again migraine HA  s/p topiramate--stopped as not  "needed anymore  s/p amitriptyline--stopped as not needed anymore  s/p Emgality--stopped as not needed anymore--? having injection pain (vs from Trulicity)  On sumatriptan prn, continue         Relevant Medications    SUMAtriptan (Imitrex) 50 mg tablet    Multiple sclerosis, relapsing-remitting (CMS/HCC) - Primary G35     Previous \"attacks\" that she and partner attributes to MS I don't think are real MS exacerbation and could be due to non-MS causes  Allergy to Tecfidera  Clinically appears stable with no known relapses recently  On Aubagio 14mg daily, continue  Last MRI brain 11/2021 reviewed, stable from 2020    Pt complaining of veering towards R side when walking \"more than usual\"--possibly due to polypharmacy? EMG/NCT BLE in past wnl (though now diabetic. No recent B12. No recent MRI brain.    S/p PT         Relevant Orders    Follow Up In Neurology    Polypharmacy Z79.899    Dizziness R42     Plans:  Stop amitriptyline  Continue aubagio/sumatriptan prn  3.   Discuss with PCP about minimizing med list  4.   Repeat MRI brain wwo  5.   Check vit B12 level    Rtc 6 mo    All questions were answered.  Pt knows how to contact my office in case pt has any questions or concerns.    Gulshan Falcon MD        "

## 2023-11-08 NOTE — ASSESSMENT & PLAN NOTE
IMPROVED  Strange on again, off again migraine HA  s/p topiramate--stopped as not needed anymore  s/p amitriptyline--stopped as not needed anymore  s/p Emgality--stopped as not needed anymore--? having injection pain (vs from Trulicity)  On sumatriptan prn, continue

## 2023-11-08 NOTE — LETTER
"November 8, 2023     Julia Molina MD  [address] [facility] [address]    Patient: Sara Uribe   YOB: 1975   Date of Visit: 11/8/2023       Dear Dr. Julia Molina MD:    Thank you for referring Sara Uribe to me for evaluation. Below are my notes for this consultation.  If you have questions, please do not hesitate to call me. I look forward to following your patient along with you.       Sincerely,     Gulshan Falcon MD      CC: No Recipients  ______________________________________________________________________________________    Telehealth visit    Visit type: Virtual follow-up    PCP: Julia Molina MD.    Subjective    Sara Uribe is a 48 y.o. year old female here for virtual follow-up. Last seen 4/5/23.    Patient is accompanied by: Other none .     Telehealth visit today. The patient was informed about the telehealth clinical encounter including benefits to avoiding travel, limitations of the assessment, and billing for the service. In-office care may be recommended if needed. Telehealth sessions are not being recorded and personal health information is protected. All questions were answered and verbal consent from the patient (or guardian) was obtained to proceed. Patient verbally confirmed, patient physically in [place].       HPI    Hx diagnosed MS who I first saw 2018. Most recently on Gilenya. Having \"MS attacks\" but atypical--no evidence that they were MS attacks. Recent MRI done 9/2018 during attack hospitalization showed no enhancing lesions. I saw her on 9/25/18. Previous neuro started her on Tecfidera. Had itching (allergy), stopped. She was to get EMG/NCT for RLE/BUE but took a while due to no transportation. Switched to Aubagio 1/2019 after discussion of side effects and monitoring needed. Still with HA. Having BLE pains. Also numbness in UE. I lowered her topiramate to 50mg bid as HA control improved, to help with polypharmacy. Was " "hospitalized May 2019 for observation at [facility] ED for loss of consciousness, brief per notes. Thought to be due to hypovolemia.    7/9/19 EMG/NCT (Dr Crowe): R ulnar neuropathy, site unable to localized, mild; no bilat CTS, no L cervical radiculopathy  7/10/19 EMG/NCT (Dr CUNNINGHAM): normal BLE, dec recruitment likely due to UMN disease  7/2/19 PSG (BMI 32.5): no SDB, EDS, with snoring; overall AHI 0    Was doing well on Aubagio, no attacks. Pt hospitalized end Aug 2020 for R eye blindness and RUE movements. RUE movements were very atypical and distractible. MRI brain/orbit wwo initially reported with no optic neuritis. Repeat MRI orbit had ? fluid in R optic nerve. 3 days IV solumedrol started. Pt's vision did not improve. NMO antibody checked and was negative. Pt discharged to rehab. We discussed about possibly seeing MS specialist for consideration of other medication other than Aubagio. She wants to consider Emgality for headaches, and to continue topiramate and triptan. She was seeing Dr. Fernandes, neurologist at [facility], apparently because \"she did not see eye to eye\" with me. Of note, patient previously tried to see Dr. Crowe but she would not see her. She had also seen Dr. Taylor, neuro-ophthalmologist, who diagnosed functional visual loss, and recommended mental health treatment. States Emgality helping with HA and to continue meds including Aubagio. Pt stopped Emgality as HA was better. To continue Aubagio.     Here today for follow-up.    Has been doing ok. Been doing pretty good. No MS attacks. Was to go for surgery for L elbow (arthritis) soon.    HA not that often. Just once in a while. No known heart issues.    Once in a while, while walking, staggering to R. Mom and GF noticed it. Feel dizzy. Been happening more than usual. I had previously evaluated her for falls with normal BLE EMG/NCT.    Still on multiple psych meds and psychoactive medications.     MS meds:  Rebif - can't remember when taken and for " "how long (discontinued due to body rejecting)  Avonex - can't remember when taken and for how long (discontinued due to not working)  Chrisya - taken for years, \"had too many attacks\", taken off Nov 2017  Was supposed to be on infusion but never happened because of insurance denial  Tecfidera - 9/25/18-10/18/18 (I asked her to stop due to allergy)  Aubagio - early Jan 2019 to present     Patient Active Problem List   Diagnosis   • Abdominal pain, chronic, left lower quadrant   • Abnormal EKG   • Abnormal stress electrocardiogram test   • Allergic conjunctivitis of both eyes   • Allergic rhinitis   • Allergy or toxic reaction to venom   • Anxiety and depression   • Arthritis   • Arthritis of left acromioclavicular joint   • Balance problem   • Benign essential hypertension   • Blurred vision   • Bursitis of left shoulder   • Chronic constipation   • Cigarette nicotine dependence without complication   • Closed nondisplaced avulsion fracture of tuberosity of right calcaneus   • Combined hyperlipidemia   • Disturbance of skin sensation   • Dysphagia   • Elbow pain, right   • Elbow sprain, right, initial encounter   • Eye swelling, left   • Falls   • Functional visual loss   • GERD without esophagitis   • Chronic migraine without aura without status migrainosus, not intractable   • Hordeolum internum of left lower eyelid   • Hyperglycemia   • Hypothyroidism   • Iliotibial band syndrome of left side   • Impingement syndrome of left shoulder   • Insomnia   • Lateral epicondylitis of right elbow   • Left arm weakness   • Chest pain   • Lumbar radiculopathy   • Lumbar spondylolysis   • Motion sickness   • Multiple sclerosis, relapsing-remitting (CMS/HCC)   • Muscle spasm   • Nontraumatic complete tear of left rotator cuff   • Peripheral neuropathy   • Physical debility   • Right serous otitis media   • Rotator cuff tear, left   • Shingles   • Solitary pulmonary nodule   • Rotator cuff tendinitis, left   • Tendinitis of " upper biceps tendon of left shoulder   • Tinea cruris   • Type 2 diabetes mellitus with hyperglycemia, without long-term current use of insulin (CMS/[facility])   • Vitamin D deficiency   • Nausea   • Shoulder joint pain   • Right hemiparesis (CMS/[facility])   • Perirectal abscess   • Lumbosacral spondylosis without myelopathy   • Degeneration of lumbosacral intervertebral disc   • Leukocytosis   • Immunocompromised, acquired (CMS/[facility])   • Hypertensive disorder   • Gastroenteritis   • Osteoarthritis   • Sleep apnea   • Right foot drop   • Optic neuritis   • Asthma   • Intervertebral disc disorder   • Polyneuropathy   • Cervicalgia   • Essential (primary) hypertension   • Other spondylosis with radiculopathy, lumbar region   • Fibromyalgia   • Bipolar disorder (CMS/[facility])   • Migraine, unspecified, not intractable, without status migrainosus   • Anxiety disorder, unspecified   • Long term (current) use of opiate analgesic   • History of falling   • Epilepsy, unspecified, not intractable, without status epilepticus (CMS/[facility])   • BMI 33.0-33.9,adult   • 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 (CMS/[facility])   • Polypharmacy   • Dizziness       Allergies   Allergen Reactions   • Bee Venom Protein (Honey Bee) Anaphylaxis, Hives and Swelling   • Latex Hives, Unknown and Rash   • Dimethyl Fumarate Unknown       Current Outpatient Medications:   •  albuterol 90 mcg/actuation inhaler, INHALE 2 PUFFS BY MOUTH EVERY 6 HOURS AS NEEDED, Disp: 8.5 g, Rfl: 0  •  ARIPiprazole (Abilify) 5 mg tablet, , Disp: , Rfl:   •  aspirin 81 mg EC tablet, Take by mouth twice a day., Disp: , Rfl:   •  atorvastatin (Lipitor) 40 mg tablet, Take 1 tablet (40 mg) by mouth once daily at bedtime., Disp: , Rfl:   •  baclofen (Lioresal) 10 mg tablet, Take 1 tablet (10 mg) by mouth 2 times a day., Disp: , Rfl:   •  blood sugar diagnostic (True Metrix Glucose Test Strip) strip, CHECK BLOOD GLUCOSE once daily, Disp: , Rfl:   •  buPROPion XL (Wellbutrin XL) 150 mg 24 hr tablet,  Take 2 tablets (300 mg) by mouth once daily., Disp: , Rfl:   •  cyclobenzaprine (Flexeril) 10 mg tablet, Take by mouth., Disp: , Rfl:   •  dapagliflozin (Farxiga) 5 mg, Take 1 tablet (5 mg) by mouth once daily in the morning. Take before meals., Disp: , Rfl:   •  dexlansoprazole (Dexilant) 60 mg DR capsule, Take 1 capsule (60 mg) by mouth once daily., Disp: , Rfl:   •  DULoxetine (Cymbalta) 60 mg DR capsule, Take 1 capsule (60 mg) by mouth once daily., Disp: , Rfl:   •  lamoTRIgine (LaMICtal) 150 mg tablet, Take 1 tablet (150 mg) by mouth once daily at bedtime., Disp: , Rfl:   •  levothyroxine (LevoxyL) 25 mcg tablet, Take by mouth once daily in the morning. Take before meals., Disp: , Rfl:   •  melatonin 5 mg tablet, Take 1 tablet (5 mg) by mouth as needed at bedtime., Disp: , Rfl:   •  meloxicam (Mobic) 15 mg tablet, Take 1 tablet (15 mg) by mouth once daily., Disp: , Rfl:   •  metFORMIN XR (Glucophage-XR) 750 mg 24 hr tablet, Take 1 tablet (750 mg) by mouth 2 times a day., Disp: , Rfl:   •  naproxen (Naprosyn) 500 mg tablet, Take by mouth twice a day., Disp: , Rfl:   •  orphenadrine (Norflex) 100 mg 12 hr tablet, Take by mouth twice a day., Disp: , Rfl:   •  Rexulti 0.5 mg tablet, take 1 tablet by mouth at bedtime, Disp: 90 tablet, Rfl: 0  •  teriflunomide (Aubagio) 14 mg tablet tablet, Take 1 tablet (14 mg) by mouth once daily., Disp: , Rfl:   •  tiZANidine (Zanaflex) 4 mg tablet, Take 1 tablet (4 mg) by mouth 3 times a day., Disp: , Rfl:   •  traZODone (Desyrel) 50 mg tablet, Take 1 tablet (50 mg) by mouth once daily., Disp: , Rfl:   •  vortioxetine (Trintellix) 20 mg tablet tablet, Take 1 tablet (20 mg) by mouth once daily., Disp: , Rfl:   •  SUMAtriptan (Imitrex) 50 mg tablet, Take 1 tablet (50 mg) by mouth 1 time if needed for migraine (MAY REPEAT DOSE IN 2 HOURS.  MAX 2 TABS IN 24 HOURS)., Disp: 9 tablet, Rfl: 2     Objective    There were no vitals taken for this visit.       Awake, alert, oriented x3, in  "no distress  Well-nourished, seated    Mental status exam as above, conversant   Full EOMs intact, no nystagmus, no ptosis   No facial droop   Hearing grossly intact   No dysarthria    Motor strength is at least antigravity on all extremities  I did not have her stand or walk          Assessment/Plan  Problem List Items Addressed This Visit             ICD-10-CM    Functional visual loss F45.8     ? etiology of stated chronic left eye blindness  Had seen Dr. Taylor, neuro-ophthalmologist--diagnosed functional visual loss         Chronic migraine without aura without status migrainosus, not intractable G43.709     IMPROVED  Strange on again, off again migraine HA  s/p topiramate--stopped as not needed anymore  s/p amitriptyline--stopped as not needed anymore  s/p Emgality--stopped as not needed anymore--? having injection pain (vs from Trulicity)  On sumatriptan prn, continue         Relevant Medications    SUMAtriptan (Imitrex) 50 mg tablet    Multiple sclerosis, relapsing-remitting (CMS/HCC) - Primary G35     Previous \"attacks\" that she and partner attributes to MS I don't think are real MS exacerbation and could be due to non-MS causes  Allergy to Tecfidera  Clinically appears stable with no known relapses recently  On Aubagio 14mg daily, continue  Last MRI brain 11/2021 reviewed, stable from 2020    Pt complaining of veering towards R side when walking \"more than usual\"--possibly due to polypharmacy? EMG/NCT BLE in past wnl (though now diabetic. No recent B12. No recent MRI brain.    S/p PT         Relevant Orders    Follow Up In Neurology    Polypharmacy Z79.899    Dizziness R42     Plans:  Stop amitriptyline  Continue aubagio/sumatriptan prn  3.   Discuss with PCP about minimizing med list  4.   Repeat MRI brain wwo  5.   Check vit B12 level    Rtc 6 mo    All questions were answered.  Pt knows how to contact my office in case pt has any questions or concerns.    Gulshan Falcon MD        "

## 2023-11-08 NOTE — ASSESSMENT & PLAN NOTE
"Previous \"attacks\" that she and partner attributes to MS I don't think are real MS exacerbation and could be due to non-MS causes  Allergy to Tecfidera  Clinically appears stable with no known relapses recently  On Aubagio 14mg daily, continue  Last MRI brain 11/2021 reviewed, stable from 2020    Pt complaining of veering towards R side when walking \"more than usual\"--possibly due to polypharmacy? EMG/NCT BLE in past wnl (though now diabetic. No recent B12. No recent MRI brain.    S/p PT  "

## 2023-11-08 NOTE — ASSESSMENT & PLAN NOTE
? etiology of stated chronic left eye blindness  Had seen Dr. Taylor, neuro-ophthalmologist--diagnosed functional visual loss

## 2023-11-08 NOTE — PATIENT INSTRUCTIONS
Stop amitriptyline  Continue aubagio/sumatriptan prn  3.   Discuss with PCP about minimizing med list  4.   Repeat MRI brain wwo  5.   Check vit B12 level    Rtc 6 mo

## 2023-11-30 ENCOUNTER — HOSPITAL ENCOUNTER (OUTPATIENT)
Dept: RADIOLOGY | Facility: HOSPITAL | Age: 48
Discharge: HOME | End: 2023-11-30
Payer: COMMERCIAL

## 2023-11-30 DIAGNOSIS — G35 MULTIPLE SCLEROSIS, RELAPSING-REMITTING (MULTI): ICD-10-CM

## 2023-11-30 PROCEDURE — 70553 MRI BRAIN STEM W/O & W/DYE: CPT | Performed by: RADIOLOGY

## 2023-11-30 PROCEDURE — A9575 INJ GADOTERATE MEGLUMI 0.1ML: HCPCS | Performed by: PSYCHIATRY & NEUROLOGY

## 2023-11-30 PROCEDURE — 2550000001 HC RX 255 CONTRASTS: Performed by: PSYCHIATRY & NEUROLOGY

## 2023-11-30 PROCEDURE — 70553 MRI BRAIN STEM W/O & W/DYE: CPT

## 2023-11-30 RX ORDER — GADOTERATE MEGLUMINE 376.9 MG/ML
0.2 INJECTION INTRAVENOUS
Status: COMPLETED | OUTPATIENT
Start: 2023-11-30 | End: 2023-11-30

## 2023-11-30 RX ADMIN — GADOTERATE MEGLUMINE 17 ML: 376.9 INJECTION INTRAVENOUS at 14:25

## 2023-12-04 ENCOUNTER — TELEPHONE (OUTPATIENT)
Dept: NEUROLOGY | Facility: HOSPITAL | Age: 48
End: 2023-12-04
Payer: MEDICAID

## 2023-12-04 NOTE — TELEPHONE ENCOUNTER
Updated pt on MRI results.  Verbalized understanding and satisfaction.    Will have B12 drawn this Friday.

## 2023-12-04 NOTE — TELEPHONE ENCOUNTER
----- Message from Gulshan Kaur MD sent at 12/3/2023  4:53 PM EST -----  12/3/23 MRI brain wwo stable compared to 2021. No new lesions. No active lesions.  Please let pt know. I'm still waiting for B12 to be drawn.    Dr kaur

## 2023-12-06 ENCOUNTER — APPOINTMENT (OUTPATIENT)
Dept: RADIOLOGY | Facility: HOSPITAL | Age: 48
End: 2023-12-06
Payer: COMMERCIAL

## 2023-12-06 ENCOUNTER — HOSPITAL ENCOUNTER (EMERGENCY)
Facility: HOSPITAL | Age: 48
Discharge: HOME | End: 2023-12-06
Payer: COMMERCIAL

## 2023-12-06 VITALS
BODY MASS INDEX: 32.58 KG/M2 | WEIGHT: 220 LBS | SYSTOLIC BLOOD PRESSURE: 137 MMHG | TEMPERATURE: 97.1 F | DIASTOLIC BLOOD PRESSURE: 85 MMHG | RESPIRATION RATE: 16 BRPM | HEART RATE: 88 BPM | HEIGHT: 69 IN | OXYGEN SATURATION: 100 %

## 2023-12-06 DIAGNOSIS — S60.222A CONTUSION OF LEFT HAND, INITIAL ENCOUNTER: Primary | ICD-10-CM

## 2023-12-06 PROCEDURE — 73130 X-RAY EXAM OF HAND: CPT | Mod: LT,FY

## 2023-12-06 PROCEDURE — 99284 EMERGENCY DEPT VISIT MOD MDM: CPT

## 2023-12-06 PROCEDURE — 73110 X-RAY EXAM OF WRIST: CPT | Mod: LEFT SIDE | Performed by: RADIOLOGY

## 2023-12-06 PROCEDURE — 73110 X-RAY EXAM OF WRIST: CPT | Mod: LT

## 2023-12-06 PROCEDURE — 73130 X-RAY EXAM OF HAND: CPT | Mod: LEFT SIDE | Performed by: RADIOLOGY

## 2023-12-06 RX ORDER — ACETAMINOPHEN 325 MG/1
650 TABLET ORAL ONCE
Status: COMPLETED | OUTPATIENT
Start: 2023-12-06 | End: 2023-12-06

## 2023-12-06 RX ADMIN — ACETAMINOPHEN 650 MG: 325 TABLET ORAL at 06:44

## 2023-12-06 ASSESSMENT — LIFESTYLE VARIABLES
EVER HAD A DRINK FIRST THING IN THE MORNING TO STEADY YOUR NERVES TO GET RID OF A HANGOVER: NO
REASON UNABLE TO ASSESS: NO
HAVE PEOPLE ANNOYED YOU BY CRITICIZING YOUR DRINKING: NO
EVER FELT BAD OR GUILTY ABOUT YOUR DRINKING: NO
HAVE YOU EVER FELT YOU SHOULD CUT DOWN ON YOUR DRINKING: NO

## 2023-12-06 ASSESSMENT — PAIN SCALES - GENERAL: PAINLEVEL_OUTOF10: 7

## 2023-12-06 ASSESSMENT — COLUMBIA-SUICIDE SEVERITY RATING SCALE - C-SSRS
1. IN THE PAST MONTH, HAVE YOU WISHED YOU WERE DEAD OR WISHED YOU COULD GO TO SLEEP AND NOT WAKE UP?: NO
6. HAVE YOU EVER DONE ANYTHING, STARTED TO DO ANYTHING, OR PREPARED TO DO ANYTHING TO END YOUR LIFE?: NO
2. HAVE YOU ACTUALLY HAD ANY THOUGHTS OF KILLING YOURSELF?: NO

## 2023-12-06 NOTE — ED PROVIDER NOTES
"HPI   Chief Complaint   Patient presents with    Hand Injury     PT smashed her left hand while using drain snake.        Patient is a 48-year-old female with no reported past medical history who presents to the emergency room today with a complaint of left hand pain. She states last evening she was \"sneaking her drain\" when the snake accidentally slipped, striking her in the hand.  She denies any other injuries.  She describes the pain as primarily in her fingers and knuckles, nonradiating, aching/throbbing, and an 8 out of 10.  She also attest to some numbness and tingling.  She is not on blood thinners.  No other complaints or concerns mentioned at this time.      History provided by:  Patient                      Amol Coma Scale Score: 15                  Patient History   Past Medical History:   Diagnosis Date    Candidal esophagitis (CMS/[facility]) 02/11/2020    Esophageal thrush    Personal history of other diseases of the circulatory system     History of hypertension    Personal history of other diseases of the nervous system and sense organs 04/20/2022    History of optic neuritis    Personal history of other diseases of the nervous system and sense organs 12/02/2022    History of blindness    Personal history of other diseases of the nervous system and sense organs     History of sleep apnea    Personal history of other diseases of the respiratory system     History of acute bronchitis    Personal history of other infectious and parasitic diseases 08/24/2018    History of candidiasis of mouth     Past Surgical History:   Procedure Laterality Date    CHOLECYSTECTOMY  02/02/2016    Cholecystectomy    ESOPHAGOGASTRODUODENOSCOPY  11/30/2017    Diagnostic Esophagogastroduodenoscopy    HYSTEROSCOPY  02/02/2016    Hysteroscopy With Endometrial Ablation    OTHER SURGICAL HISTORY  05/28/2020    Esophagogastroduodenoscopy    OTHER SURGICAL HISTORY  10/30/2019    Cyst excision     Family History   Problem Relation Name " Age of Onset    Rheum arthritis Mother      Alcohol abuse Father      Coronary artery disease Father      Lung cancer Maternal Grandmother      Lung cancer Maternal Grandfather      Lung cancer Paternal Grandmother      Colon cancer Paternal Grandfather      Breast cancer Other AUNT     Multiple sclerosis Other AUNT     Colon cancer Other UNCLE     Breast cancer Other COUSIN      Social History     Tobacco Use    Smoking status: Every Day     Packs/day: .5     Types: Cigarettes    Smokeless tobacco: Never   Substance Use Topics    Alcohol use: Not on file    Drug use: Not on file       Physical Exam   ED Triage Vitals [12/06/23 0619]   Temp Heart Rate Resp BP   36.2 °C (97.1 °F) 88 16 137/85      SpO2 Temp src Heart Rate Source Patient Position   100 % -- -- --      BP Location FiO2 (%)     -- --       Physical Exam  Vitals and nursing note reviewed.   Constitutional:       General: She is not in acute distress.     Appearance: Normal appearance. She is not ill-appearing, toxic-appearing or diaphoretic.   HENT:      Head: Normocephalic and atraumatic.   Cardiovascular:      Rate and Rhythm: Normal rate and regular rhythm.      Pulses: Normal pulses.   Pulmonary:      Effort: Pulmonary effort is normal.      Breath sounds: Normal breath sounds.   Musculoskeletal:         General: Swelling, tenderness and signs of injury present.      Left wrist: Swelling, tenderness and bony tenderness present. No deformity, effusion, lacerations, snuff box tenderness or crepitus. Decreased range of motion. Normal pulse.      Right hand: Normal.      Left hand: Swelling, tenderness and bony tenderness present. No deformity or lacerations. Decreased range of motion. Decreased strength of wrist extension. Normal strength of finger abduction. Decreased sensation. Normal sensation of the ulnar distribution, median distribution and radial distribution. There is no disruption of two-point discrimination. Normal capillary refill. Normal  "pulse.      Comments: Tenderness with palpation to the third fourth and fifth MCP.  No obvious deformity or dislocation.  Capillary refill normal.  Patient does attest to some numbness to the distal aspect of those digits.   Skin:     General: Skin is warm and dry.      Capillary Refill: Capillary refill takes less than 2 seconds.   Neurological:      General: No focal deficit present.      Mental Status: She is alert and oriented to person, place, and time.   Psychiatric:         Mood and Affect: Mood normal.         Behavior: Behavior normal.         ED Course & MDM   Diagnoses as of 12/06/23 0838   Contusion of left hand, initial encounter       Medical Decision Making  Patient is a 48-year-old female with no reported past medical history who presents to the emergency room today with a complaint of left hand pain. She states last evening she was \"sneaking her drain\" when the snake accidentally slipped, striking her in the hand.  She denies any other injuries.  She describes the pain as primarily in her fingers and knuckles, nonradiating, aching/throbbing, and an 8 out of 10.  She also attest to some numbness and tingling.  She is not on blood thinners.     X-rays ordered.  Patient's pain was treated with Tylenol p.o.    X-rays independently reviewed by myself and interpreted by radiology as: No acute fracture or dislocation. Slight DJD in the 3rd PIP joint. Subtle erosive changes in the ulnar aspect of the carpometacarpal joint.    I discussed these findings with the patient.  I advised her at this time, have not found any life-threatening cause of her symptoms and feel they are most likely secondary to a severe contusion.  I feel she can be safely discharged home with strict return precautions.  An elastic bandage was applied by nursing staff under my direct supervision.  Patient neurologically intact after application.    I have reviewed the patient's vital signs, nursing notes, and other relevant " tests/information.  I had a detailed discussion regarding the historical points, exam findings, and any diagnostic results supporting the discharge diagnosis.  I also discussed the need for outpatient follow-up with her PCP in the next 2 to 3 days and the need to return to the ED if symptoms worsen,  become concerning in any way or if there are any questions or concerns that arise at home.  Patient is in agreement this plan.  All questions and concerns addressed.      Amount and/or Complexity of Data Reviewed  Radiology: ordered and independent interpretation performed. Decision-making details documented in ED Course.        Procedure  Procedures     CHAGO Lowry-CARMEN  12/06/23 0839

## 2023-12-11 ENCOUNTER — APPOINTMENT (OUTPATIENT)
Dept: PRIMARY CARE | Facility: CLINIC | Age: 48
End: 2023-12-11
Payer: MEDICAID

## 2024-01-04 ENCOUNTER — LAB (OUTPATIENT)
Dept: LAB | Facility: LAB | Age: 49
End: 2024-01-04
Payer: COMMERCIAL

## 2024-01-04 ENCOUNTER — PREP FOR PROCEDURE (OUTPATIENT)
Dept: ORTHOPEDIC SURGERY | Facility: CLINIC | Age: 49
End: 2024-01-04

## 2024-01-04 ENCOUNTER — OFFICE VISIT (OUTPATIENT)
Dept: ORTHOPEDIC SURGERY | Facility: CLINIC | Age: 49
End: 2024-01-04
Payer: COMMERCIAL

## 2024-01-04 VITALS — BODY MASS INDEX: 32.58 KG/M2 | WEIGHT: 220 LBS | HEIGHT: 69 IN

## 2024-01-04 DIAGNOSIS — E53.8 B12 DEFICIENCY: Primary | ICD-10-CM

## 2024-01-04 DIAGNOSIS — R42 DIZZINESS: ICD-10-CM

## 2024-01-04 DIAGNOSIS — M77.12 LATERAL EPICONDYLITIS OF LEFT ELBOW: Primary | ICD-10-CM

## 2024-01-04 LAB — VIT B12 SERPL-MCNC: 239 PG/ML (ref 211–911)

## 2024-01-04 PROCEDURE — 36415 COLL VENOUS BLD VENIPUNCTURE: CPT

## 2024-01-04 PROCEDURE — 82607 VITAMIN B-12: CPT

## 2024-01-04 PROCEDURE — 99214 OFFICE O/P EST MOD 30 MIN: CPT | Performed by: ORTHOPAEDIC SURGERY

## 2024-01-04 NOTE — H&P (VIEW-ONLY)
48 y.o. female presents today for follow up of left lateral sided elbow pain. The patient reports symptoms for weeks, getting worse. Pain is controlled. Patient reports no numbness and tingling.  Reports no previous surgeries or trauma to the area.  Reports pain worse with use, better at rest.  Pain dull ache, sharp at times. Worse lifting things.  Had TOPAZ on right in past, doing well.  Had a shot in the left, helped, but symptoms returned, would like surgery.      Review of Systems    Constitutional: no fever, no chills, not feeling tired, no recent weight gain and no recent weight loss.   ENT: no nosebleeds.   Cardiovascular: no chest pain.   Respiratory: no shortness of breath and no cough.   Gastrointestinal: no abdominal pain, no nausea, no vomiting and no diarrhea.   Musculoskeletal: per HPI  Integumentary: no rashes and no skin wound.   Neurological: no headache.   Psychiatric: no depression and no sleep disturbances.   Endocrine: no muscle weakness and no muscle cramps.   Hematologic/Lymphatic: no swollen glands and no tendency for easy bruising.     All other systems have been reviewed and are negative for complaint    Patient's past medical history, past surgical history, allergies, and medications have been reviewed unless otherwise noted in the chart.     Lateral Epicondylitis  Inspection:  no evidence of infection, no erythema, no edema, Palpation:  compartments are soft, positive pain over the lateral epicondyle, Range of Motion:  full elbow flexion, 20 from full extension Stability:  no elbow instability noted, Strength:  5/5 elbow flexion/extension, Skin:  intact, Vascular:  capillary refill <2 seconds distally, Sensation:  intact to light touch distally, Tests:  pain with resisted wrist extension over lateral epicondyle.      Constitutional   General appearance: Alert and in no acute distress. Well developed, well nourished.    Eyes   External Eye, Conjunctiva and lids: Normal external exam -  pupils were equal in size, round, reactive to light (PERRL) with normal accommodation and extraocular movements intact (EOMI).   Ears, Nose, Mouth, and Throat   Hearing: Normal.   Neck   Neck: No neck mass was observed. Supple.   Pulmonary   Respiratory effort: No respiratory distress.   Auscultation of lungs: Clear bilateral breath sounds.   Cardiovascular   Examination of extremities: No peripheral edema.   Auscultation of heart: Heart rate and rhythm were normal   Neurologic   Sensation: Normal.   Psychiatric   Judgment and insight: Intact.   Orientation to person, place, and time: Alert and oriented x 3.       Mood and affect: Normal.      Left lateral epicondylitis  Surgery discussion I discussed the diagnosis and treatment options with the patient today along with their associated risks and benefits. After thorough discussion, the patient has elected to proceed with surgical intervention. The patient understands the risks of bleeding, infection, loss of life or limb, pain, loss of motion, failure of the goals of surgery or the potential need for additional surgery. The patient would like to accept these risks and proceed. All questions were answered to the patients satisfaction and the patient seems satisfied with the plan.    Patient has failed Voltaren gel pr, Handmaster plus, Band-it, steroid injection, an would like surgery since she had it on her right and did well  Plan left lateral epicondyle TOPAZ procedure  FU 2 weeks after - No XR

## 2024-01-04 NOTE — PROGRESS NOTES
Follow up Left Lateral Epicondylitis   Patient would like to discuss other options / possible surgery - in the past Right elbow sx and Left shoulder arthroscopy sx

## 2024-01-12 ENCOUNTER — ANESTHESIA EVENT (OUTPATIENT)
Dept: OPERATING ROOM | Facility: HOSPITAL | Age: 49
End: 2024-01-12
Payer: COMMERCIAL

## 2024-01-15 ENCOUNTER — HOSPITAL ENCOUNTER (OUTPATIENT)
Dept: CARDIOLOGY | Facility: HOSPITAL | Age: 49
Discharge: HOME | End: 2024-01-15
Payer: COMMERCIAL

## 2024-01-15 ENCOUNTER — PRE-ADMISSION TESTING (OUTPATIENT)
Dept: PREADMISSION TESTING | Facility: HOSPITAL | Age: 49
End: 2024-01-15
Payer: COMMERCIAL

## 2024-01-15 VITALS
RESPIRATION RATE: 20 BRPM | SYSTOLIC BLOOD PRESSURE: 136 MMHG | OXYGEN SATURATION: 100 % | TEMPERATURE: 96 F | HEART RATE: 94 BPM | DIASTOLIC BLOOD PRESSURE: 84 MMHG | WEIGHT: 219 LBS | HEIGHT: 69 IN | BODY MASS INDEX: 32.44 KG/M2

## 2024-01-15 DIAGNOSIS — M77.8 TENDONITIS OF ELBOW, LEFT: Primary | ICD-10-CM

## 2024-01-15 DIAGNOSIS — M25.522 ARTHRALGIA OF LEFT ELBOW: ICD-10-CM

## 2024-01-15 LAB
ANION GAP SERPL CALC-SCNC: 15 MMOL/L (ref 10–20)
ATRIAL RATE: 76 BPM
BUN SERPL-MCNC: 7 MG/DL (ref 6–23)
CALCIUM SERPL-MCNC: 8.7 MG/DL (ref 8.6–10.3)
CHLORIDE SERPL-SCNC: 103 MMOL/L (ref 98–107)
CO2 SERPL-SCNC: 24 MMOL/L (ref 21–32)
CREAT SERPL-MCNC: 0.62 MG/DL (ref 0.5–1.05)
EGFRCR SERPLBLD CKD-EPI 2021: >90 ML/MIN/1.73M*2
ERYTHROCYTE [DISTWIDTH] IN BLOOD BY AUTOMATED COUNT: 12.6 % (ref 11.5–14.5)
GLUCOSE SERPL-MCNC: 159 MG/DL (ref 74–99)
HCT VFR BLD AUTO: 43.7 % (ref 36–46)
HGB BLD-MCNC: 14.5 G/DL (ref 12–16)
MCH RBC QN AUTO: 30.1 PG (ref 26–34)
MCHC RBC AUTO-ENTMCNC: 33.2 G/DL (ref 32–36)
MCV RBC AUTO: 91 FL (ref 80–100)
NRBC BLD-RTO: 0 /100 WBCS (ref 0–0)
P AXIS: 40 DEGREES
PLATELET # BLD AUTO: 247 X10*3/UL (ref 150–450)
POTASSIUM SERPL-SCNC: 3.8 MMOL/L (ref 3.5–5.3)
PR INTERVAL: 161 MS
Q ONSET: 252 MS
QRS COUNT: 12 BEATS
QRS DURATION: 93 MS
QT INTERVAL: 391 MS
QTC CALCULATION(BAZETT): 440 MS
QTC FREDERICIA: 422 MS
R AXIS: -21 DEGREES
RBC # BLD AUTO: 4.81 X10*6/UL (ref 4–5.2)
SODIUM SERPL-SCNC: 138 MMOL/L (ref 136–145)
T AXIS: -1 DEGREES
T OFFSET: 448 MS
VENTRICULAR RATE: 76 BPM
WBC # BLD AUTO: 11 X10*3/UL (ref 4.4–11.3)

## 2024-01-15 PROCEDURE — 85027 COMPLETE CBC AUTOMATED: CPT

## 2024-01-15 PROCEDURE — 93005 ELECTROCARDIOGRAM TRACING: CPT

## 2024-01-15 PROCEDURE — 80048 BASIC METABOLIC PNL TOTAL CA: CPT

## 2024-01-15 PROCEDURE — 93010 ELECTROCARDIOGRAM REPORT: CPT | Performed by: INTERNAL MEDICINE

## 2024-01-15 PROCEDURE — 36415 COLL VENOUS BLD VENIPUNCTURE: CPT

## 2024-01-15 NOTE — PREPROCEDURE INSTRUCTIONS
Medication List            Accurate as of January 15, 2024  9:34 AM. Always use your most recent med list.                albuterol 90 mcg/actuation inhaler  INHALE 2 PUFFS BY MOUTH EVERY 6 HOURS AS NEEDED     ARIPiprazole 5 mg tablet  Commonly known as: Abilify     aspirin 81 mg EC tablet     atorvastatin 40 mg tablet  Commonly known as: Lipitor     Aubagio 14 mg tablet tablet  Generic drug: teriflunomide     baclofen 10 mg tablet  Commonly known as: Lioresal     buPROPion  mg 24 hr tablet  Commonly known as: Wellbutrin XL     cyclobenzaprine 10 mg tablet  Commonly known as: Flexeril     Cymbalta 60 mg DR capsule  Generic drug: DULoxetine     Dexilant 60 mg DR capsule  Generic drug: dexlansoprazole     Farxiga 5 mg  Generic drug: dapagliflozin propanediol     lamoTRIgine 150 mg tablet  Commonly known as: LaMICtal     LevoxyL 25 mcg tablet  Generic drug: levothyroxine     melatonin 5 mg tablet     meloxicam 15 mg tablet  Commonly known as: Mobic     metFORMIN  mg 24 hr tablet  Commonly known as: Glucophage-XR  take 1 tablet by mouth twice a day with food     naproxen 500 mg tablet  Commonly known as: Naprosyn     orphenadrine 100 mg 12 hr tablet  Commonly known as: Norflex     Rexulti 0.5 mg tablet  Generic drug: brexpiprazole  take 1 tablet by mouth at bedtime     SUMAtriptan 50 mg tablet  Commonly known as: Imitrex  Take 1 tablet (50 mg) by mouth 1 time if needed for migraine (MAY REPEAT DOSE IN 2 HOURS.  MAX 2 TABS IN 24 HOURS).     tiZANidine 4 mg tablet  Commonly known as: Zanaflex     traZODone 50 mg tablet  Commonly known as: Desyrel     Trintellix 20 mg tablet tablet  Generic drug: vortioxetine     True Metrix Glucose Test Strip strip  Generic drug: blood sugar diagnostic                              NPO Instructions:    Nothing to eat  or drink after midnight   Hydrate well prior to surgery  Last dose mobic and naprosyn and aspirin 1/16/2024  Morning of surgery take aubagio with small sip of  water  Last dose Farxiga  1/19    Additional Instructions:     Wear  comfortable loose fitting clothing  Do not use moisturizers, creams, lotions or perfume  All jewelry and valuables should be left at home  Shower morning of, deodorant  only under right arm  Call with any questions [phone]

## 2024-01-23 ENCOUNTER — HOSPITAL ENCOUNTER (OUTPATIENT)
Facility: HOSPITAL | Age: 49
Setting detail: OUTPATIENT SURGERY
Discharge: HOME | End: 2024-01-23
Attending: ORTHOPAEDIC SURGERY | Admitting: ORTHOPAEDIC SURGERY
Payer: COMMERCIAL

## 2024-01-23 ENCOUNTER — ANESTHESIA (OUTPATIENT)
Dept: OPERATING ROOM | Facility: HOSPITAL | Age: 49
End: 2024-01-23
Payer: COMMERCIAL

## 2024-01-23 VITALS
HEIGHT: 69 IN | HEART RATE: 84 BPM | SYSTOLIC BLOOD PRESSURE: 123 MMHG | DIASTOLIC BLOOD PRESSURE: 81 MMHG | TEMPERATURE: 97.1 F | WEIGHT: 220 LBS | OXYGEN SATURATION: 97 % | RESPIRATION RATE: 20 BRPM | BODY MASS INDEX: 32.58 KG/M2

## 2024-01-23 DIAGNOSIS — M77.12 LATERAL EPICONDYLITIS OF LEFT ELBOW: Primary | ICD-10-CM

## 2024-01-23 LAB — PREGNANCY TEST URINE, POC: NEGATIVE

## 2024-01-23 PROCEDURE — 7100000010 HC PHASE TWO TIME - EACH INCREMENTAL 1 MINUTE: Performed by: ORTHOPAEDIC SURGERY

## 2024-01-23 PROCEDURE — 2500000005 HC RX 250 GENERAL PHARMACY W/O HCPCS: Performed by: NURSE ANESTHETIST, CERTIFIED REGISTERED

## 2024-01-23 PROCEDURE — 24358 REPAIR ELBOW W/DEB OPEN: CPT | Performed by: ORTHOPAEDIC SURGERY

## 2024-01-23 PROCEDURE — 2500000004 HC RX 250 GENERAL PHARMACY W/ HCPCS (ALT 636 FOR OP/ED): Performed by: ANESTHESIOLOGY

## 2024-01-23 PROCEDURE — 7100000001 HC RECOVERY ROOM TIME - INITIAL BASE CHARGE: Performed by: ORTHOPAEDIC SURGERY

## 2024-01-23 PROCEDURE — 2500000004 HC RX 250 GENERAL PHARMACY W/ HCPCS (ALT 636 FOR OP/ED): Performed by: NURSE ANESTHETIST, CERTIFIED REGISTERED

## 2024-01-23 PROCEDURE — 7100000009 HC PHASE TWO TIME - INITIAL BASE CHARGE: Performed by: ORTHOPAEDIC SURGERY

## 2024-01-23 PROCEDURE — 7100000002 HC RECOVERY ROOM TIME - EACH INCREMENTAL 1 MINUTE: Performed by: ORTHOPAEDIC SURGERY

## 2024-01-23 PROCEDURE — 3600000003 HC OR TIME - INITIAL BASE CHARGE - PROCEDURE LEVEL THREE: Performed by: ORTHOPAEDIC SURGERY

## 2024-01-23 PROCEDURE — 81025 URINE PREGNANCY TEST: CPT | Performed by: ANESTHESIOLOGY

## 2024-01-23 PROCEDURE — 3700000001 HC GENERAL ANESTHESIA TIME - INITIAL BASE CHARGE: Performed by: ORTHOPAEDIC SURGERY

## 2024-01-23 PROCEDURE — 3700000002 HC GENERAL ANESTHESIA TIME - EACH INCREMENTAL 1 MINUTE: Performed by: ORTHOPAEDIC SURGERY

## 2024-01-23 PROCEDURE — 2500000005 HC RX 250 GENERAL PHARMACY W/O HCPCS: Performed by: ORTHOPAEDIC SURGERY

## 2024-01-23 PROCEDURE — 3600000008 HC OR TIME - EACH INCREMENTAL 1 MINUTE - PROCEDURE LEVEL THREE: Performed by: ORTHOPAEDIC SURGERY

## 2024-01-23 PROCEDURE — 2720000007 HC OR 272 NO HCPCS: Performed by: ORTHOPAEDIC SURGERY

## 2024-01-23 RX ORDER — SODIUM CHLORIDE, SODIUM LACTATE, POTASSIUM CHLORIDE, CALCIUM CHLORIDE 600; 310; 30; 20 MG/100ML; MG/100ML; MG/100ML; MG/100ML
100 INJECTION, SOLUTION INTRAVENOUS CONTINUOUS
Status: DISCONTINUED | OUTPATIENT
Start: 2024-01-23 | End: 2024-01-23 | Stop reason: HOSPADM

## 2024-01-23 RX ORDER — FENTANYL CITRATE 50 UG/ML
INJECTION, SOLUTION INTRAMUSCULAR; INTRAVENOUS AS NEEDED
Status: DISCONTINUED | OUTPATIENT
Start: 2024-01-23 | End: 2024-01-23

## 2024-01-23 RX ORDER — HYDRALAZINE HYDROCHLORIDE 20 MG/ML
5 INJECTION INTRAMUSCULAR; INTRAVENOUS EVERY 30 MIN PRN
Status: DISCONTINUED | OUTPATIENT
Start: 2024-01-23 | End: 2024-01-23 | Stop reason: HOSPADM

## 2024-01-23 RX ORDER — PROPOFOL 10 MG/ML
INJECTION, EMULSION INTRAVENOUS AS NEEDED
Status: DISCONTINUED | OUTPATIENT
Start: 2024-01-23 | End: 2024-01-23

## 2024-01-23 RX ORDER — CEFAZOLIN SODIUM 1 G/50ML
SOLUTION INTRAVENOUS AS NEEDED
Status: DISCONTINUED | OUTPATIENT
Start: 2024-01-23 | End: 2024-01-23

## 2024-01-23 RX ORDER — ALBUTEROL SULFATE 0.83 MG/ML
2.5 SOLUTION RESPIRATORY (INHALATION) ONCE AS NEEDED
Status: DISCONTINUED | OUTPATIENT
Start: 2024-01-23 | End: 2024-01-23 | Stop reason: HOSPADM

## 2024-01-23 RX ORDER — ONDANSETRON HYDROCHLORIDE 2 MG/ML
4 INJECTION, SOLUTION INTRAVENOUS ONCE AS NEEDED
Status: DISCONTINUED | OUTPATIENT
Start: 2024-01-23 | End: 2024-01-23 | Stop reason: HOSPADM

## 2024-01-23 RX ORDER — LIDOCAINE HYDROCHLORIDE 10 MG/ML
0.1 INJECTION, SOLUTION EPIDURAL; INFILTRATION; INTRACAUDAL; PERINEURAL ONCE
Status: DISCONTINUED | OUTPATIENT
Start: 2024-01-23 | End: 2024-01-23 | Stop reason: HOSPADM

## 2024-01-23 RX ORDER — LIDOCAINE HCL/PF 100 MG/5ML
SYRINGE (ML) INTRAVENOUS AS NEEDED
Status: DISCONTINUED | OUTPATIENT
Start: 2024-01-23 | End: 2024-01-23

## 2024-01-23 RX ORDER — MORPHINE SULFATE 2 MG/ML
2 INJECTION, SOLUTION INTRAMUSCULAR; INTRAVENOUS EVERY 5 MIN PRN
Status: DISCONTINUED | OUTPATIENT
Start: 2024-01-23 | End: 2024-01-23 | Stop reason: HOSPADM

## 2024-01-23 RX ORDER — OXYCODONE AND ACETAMINOPHEN 5; 325 MG/1; MG/1
1 TABLET ORAL EVERY 4 HOURS PRN
Status: DISCONTINUED | OUTPATIENT
Start: 2024-01-23 | End: 2024-01-23 | Stop reason: HOSPADM

## 2024-01-23 RX ORDER — DROPERIDOL 2.5 MG/ML
0.62 INJECTION, SOLUTION INTRAMUSCULAR; INTRAVENOUS ONCE AS NEEDED
Status: DISCONTINUED | OUTPATIENT
Start: 2024-01-23 | End: 2024-01-23 | Stop reason: HOSPADM

## 2024-01-23 RX ORDER — LABETALOL HYDROCHLORIDE 5 MG/ML
5 INJECTION, SOLUTION INTRAVENOUS ONCE AS NEEDED
Status: DISCONTINUED | OUTPATIENT
Start: 2024-01-23 | End: 2024-01-23 | Stop reason: HOSPADM

## 2024-01-23 RX ORDER — BUPIVACAINE HYDROCHLORIDE 5 MG/ML
INJECTION, SOLUTION PERINEURAL AS NEEDED
Status: DISCONTINUED | OUTPATIENT
Start: 2024-01-23 | End: 2024-01-23 | Stop reason: HOSPADM

## 2024-01-23 RX ORDER — LIDOCAINE HYDROCHLORIDE 20 MG/ML
INJECTION, SOLUTION INFILTRATION; PERINEURAL AS NEEDED
Status: DISCONTINUED | OUTPATIENT
Start: 2024-01-23 | End: 2024-01-23 | Stop reason: HOSPADM

## 2024-01-23 RX ORDER — MIDAZOLAM HYDROCHLORIDE 1 MG/ML
INJECTION, SOLUTION INTRAMUSCULAR; INTRAVENOUS AS NEEDED
Status: DISCONTINUED | OUTPATIENT
Start: 2024-01-23 | End: 2024-01-23

## 2024-01-23 RX ORDER — FAMOTIDINE 10 MG/ML
20 INJECTION INTRAVENOUS ONCE
Status: COMPLETED | OUTPATIENT
Start: 2024-01-23 | End: 2024-01-23

## 2024-01-23 RX ORDER — DIPHENHYDRAMINE HYDROCHLORIDE 50 MG/ML
12.5 INJECTION INTRAMUSCULAR; INTRAVENOUS ONCE AS NEEDED
Status: DISCONTINUED | OUTPATIENT
Start: 2024-01-23 | End: 2024-01-23 | Stop reason: HOSPADM

## 2024-01-23 RX ORDER — HYDROCODONE BITARTRATE AND ACETAMINOPHEN 5; 325 MG/1; MG/1
1 TABLET ORAL EVERY 6 HOURS PRN
Qty: 10 TABLET | Refills: 0 | Status: SHIPPED | OUTPATIENT
Start: 2024-01-23 | End: 2024-05-21 | Stop reason: WASHOUT

## 2024-01-23 RX ORDER — MEPERIDINE HYDROCHLORIDE 25 MG/ML
12.5 INJECTION INTRAMUSCULAR; INTRAVENOUS; SUBCUTANEOUS EVERY 10 MIN PRN
Status: DISCONTINUED | OUTPATIENT
Start: 2024-01-23 | End: 2024-01-23 | Stop reason: HOSPADM

## 2024-01-23 RX ADMIN — FAMOTIDINE 20 MG: 10 INJECTION, SOLUTION INTRAVENOUS at 13:07

## 2024-01-23 RX ADMIN — CEFAZOLIN SODIUM 1 G: 1 INJECTION, SOLUTION INTRAVENOUS at 14:00

## 2024-01-23 RX ADMIN — FENTANYL CITRATE 50 MCG: 50 INJECTION, SOLUTION INTRAMUSCULAR; INTRAVENOUS at 14:05

## 2024-01-23 RX ADMIN — HYDROMORPHONE HYDROCHLORIDE 0.5 MG: 0.5 INJECTION, SOLUTION INTRAMUSCULAR; INTRAVENOUS; SUBCUTANEOUS at 14:43

## 2024-01-23 RX ADMIN — MIDAZOLAM HYDROCHLORIDE 2 MG: 1 INJECTION, SOLUTION INTRAMUSCULAR; INTRAVENOUS at 13:54

## 2024-01-23 RX ADMIN — SODIUM CHLORIDE, POTASSIUM CHLORIDE, SODIUM LACTATE AND CALCIUM CHLORIDE 100 ML/HR: 600; 310; 30; 20 INJECTION, SOLUTION INTRAVENOUS at 13:07

## 2024-01-23 RX ADMIN — PROPOFOL 200 MG: 10 INJECTION, EMULSION INTRAVENOUS at 14:05

## 2024-01-23 RX ADMIN — FENTANYL CITRATE 50 MCG: 50 INJECTION, SOLUTION INTRAMUSCULAR; INTRAVENOUS at 14:06

## 2024-01-23 RX ADMIN — CEFAZOLIN SODIUM 1 G: 1 INJECTION, SOLUTION INTRAVENOUS at 13:55

## 2024-01-23 RX ADMIN — SODIUM CHLORIDE, SODIUM LACTATE, POTASSIUM CHLORIDE, AND CALCIUM CHLORIDE: 600; 310; 30; 20 INJECTION, SOLUTION INTRAVENOUS at 13:49

## 2024-01-23 RX ADMIN — LIDOCAINE HYDROCHLORIDE 100 MG: 20 INJECTION, SOLUTION INTRAVENOUS at 14:05

## 2024-01-23 SDOH — HEALTH STABILITY: MENTAL HEALTH: CURRENT SMOKER: 0

## 2024-01-23 ASSESSMENT — PAIN SCALES - GENERAL
PAINLEVEL_OUTOF10: 7
PAINLEVEL_OUTOF10: 6
PAINLEVEL_OUTOF10: 7
PAINLEVEL_OUTOF10: 5 - MODERATE PAIN
PAIN_LEVEL: 2

## 2024-01-23 ASSESSMENT — PAIN DESCRIPTION - ORIENTATION: ORIENTATION: LEFT

## 2024-01-23 ASSESSMENT — PAIN - FUNCTIONAL ASSESSMENT
PAIN_FUNCTIONAL_ASSESSMENT: 0-10

## 2024-01-23 ASSESSMENT — COLUMBIA-SUICIDE SEVERITY RATING SCALE - C-SSRS
1. IN THE PAST MONTH, HAVE YOU WISHED YOU WERE DEAD OR WISHED YOU COULD GO TO SLEEP AND NOT WAKE UP?: NO
2. HAVE YOU ACTUALLY HAD ANY THOUGHTS OF KILLING YOURSELF?: NO
6. HAVE YOU EVER DONE ANYTHING, STARTED TO DO ANYTHING, OR PREPARED TO DO ANYTHING TO END YOUR LIFE?: NO

## 2024-01-23 ASSESSMENT — PAIN DESCRIPTION - LOCATION: LOCATION: ELBOW

## 2024-01-23 NOTE — ANESTHESIA POSTPROCEDURE EVALUATION
Patient: Sara Uribe    Procedure Summary       Date: 01/23/24 Room / Location: [facility] OR 03 / Virtual [facility] OR    Anesthesia Start: 1349 Anesthesia Stop: 1432    Procedure: LEFT ELBOW TOPAZ PROCEDURE( LOCAL/MAC)* TOPAZ PROBE* (Left: Elbow) Diagnosis:       Lateral epicondylitis of left elbow      (Lateral epicondylitis of left elbow [M77.12])    Surgeons: Domingo Mendoza DO Responsible Provider: KELLEE Horton    Anesthesia Type: regional, MAC ASA Status: 3            Anesthesia Type: regional, MAC    Vitals Value Taken Time   /73 01/23/24 1500   Temp 36.2 °C (97.1 °F) 01/23/24 1500   Pulse 83 01/23/24 1500   Resp 23 01/23/24 1500   SpO2 98 % 01/23/24 1500       Anesthesia Post Evaluation    Patient location during evaluation: PACU  Patient participation: complete - patient participated  Level of consciousness: awake  Pain score: 2  Pain management: adequate  Airway patency: patent  Cardiovascular status: acceptable  Respiratory status: acceptable  Hydration status: acceptable  Postoperative Nausea and Vomiting: none        There were no known notable events for this encounter.

## 2024-01-23 NOTE — DISCHARGE INSTRUCTIONS
[facility]  [phone]   Fax: [phone] [address] [facility] [address] - 1st Floor [address]   [address] [facility] -  [address] [place] [address] [id number] [place] [id number]    Upper Extremity - Lateral epicondylitis (TOPAZ procedure)         Dressing    You have a soft bandage over the operative site.  DO NOT GET DRESSING WET! Once at home, if your dressing feels too tight or mistakenly gets wet, please contact the office. This bandage can be removed in 48 hours and replaced in with Band-Aids as needed. After 48 hours you may wash your hands and shower, but no submerging.    If your sutures are visible (black strings), they will be removed about 10-14 days after surgery.  You should make an appointment to see your physician 10-14 after surgery.    2. Activity     Most people underestimate the length of time required to fully recover from surgery.  It is recommended you take some pain medication the evening following your surgery so when the local anesthetic wears off (in the middle of the night), you are not in severe pain.   With pain medication, start at the lowest dose that controls your pain.       After the first 1-3 days, we encourage you to balance your activity between ambulation, sitting in a chair, and resting in bed with your arm elevated. Let swelling and pain be your guide to activity, you should avoid prolonged (over 20 minutes) standing or sitting. In general, limit your activities to home for the first 1-3 days.    3. Pain    You will be discharged to home with a prescription for oral pain medication. A most important factor in pain control is rest and elevation. Ice may also be applied to the operative site for 30 minute intervals. Please use a waterproof bag for ice or cold packs.  Again, as you feel the numbness resolving and some onset of discomfort, please take pain medication, don't wait till full resolution of block.   Try to use the lowest dose of pain medication that controls your  pain.      The pain medication may cause constipation. Drink plenty of fluids, eat fruits and vegetables. You may use an over the counter laxative or stool softener if necessary. Take pain medication with some food in your stomach, as prescribed by your pharmacist.     4. Elevation   Elevation of the operative extremity is critical. Elevation reduces swelling and minimizes pain. Less swelling is associated with a lower infectious rate, fewer wound complications, less post-operative stiffness, and more rapid recovery of function. To keep the swelling down, your hand must be kept above the level of your heart.

## 2024-01-23 NOTE — ANESTHESIA PREPROCEDURE EVALUATION
Patient: Sara Uribe    Procedure Information       Date/Time: 01/23/24 4111    Procedure: LEFT ELBOW TOPAZ PROCEDURE( LOCAL/MAC)* TOPAZ PROBE* (Left: Elbow) - LOCAL/MAC, 15 MINUTES, TOPAZ PROBE    Location: [facility] 07 / Virtual [facility] OR    Surgeons: Domingo Mendoza, DO            Relevant Problems   Anesthesia (within normal limits)      Cardiovascular   (+) Abnormal EKG   (+) Benign essential hypertension   (+) Chest pain   (+) Combined hyperlipidemia   (+) Essential (primary) hypertension   (+) Hypertensive disorder      Endocrine   (+) Hypothyroidism   (+) Type 2 diabetes mellitus with hyperglycemia, without long-term current use of insulin (CMS/HCC)      GI   (+) GERD without esophagitis      /Renal (within normal limits)      Neuro/Psych   (+) Anxiety and depression   (+) Anxiety disorder, unspecified   (+) Bipolar disorder (CMS/HCC)   (+) 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)   (+) Epilepsy, unspecified, not intractable, without status epilepticus (CMS/HCC)   (+) Lumbar radiculopathy   (+) Multiple sclerosis, relapsing-remitting (CMS/HCC)   (+) Peripheral neuropathy   (+) Polyneuropathy      Pulmonary   (+) Asthma      GI/Hepatic (within normal limits)      Hematology (within normal limits)      Musculoskeletal   (+) Degeneration of lumbosacral intervertebral disc   (+) Fibromyalgia   (+) Lumbosacral spondylosis without myelopathy   (+) Osteoarthritis   (+) Other spondylosis with radiculopathy, lumbar region      Eyes, Ears, Nose, and Throat (within normal limits)      Infectious Disease   (+) Immunocompromised, acquired (CMS/HCC)   (+) Shingles   (+) Tinea cruris      Other   (+) Arthritis   (+) Arthritis of left acromioclavicular joint   (+) Impingement syndrome of left shoulder   (+) Lateral epicondylitis of left elbow   (+) Lateral epicondylitis of right elbow       Clinical information reviewed:   Tobacco  Allergies   Problems  Med Hx  Surg Hx   Fam Hx  Soc Hx        NPO Detail:  No  data recorded     Physical Exam    Airway  Mallampati: II     Cardiovascular - normal exam     Dental - normal exam     Pulmonary - normal exam     Abdominal - normal exam             Anesthesia Plan    History of general anesthesia?: yes  History of complications of general anesthesia?: no    ASA 3     MAC   (Left Supraclavicular Nerve Block)  The patient is not a current smoker.    intravenous induction   Postoperative administration of opioids is intended.  Anesthetic plan and risks discussed with patient.  Use of blood products discussed with patient who.    Plan discussed with CRNA.

## 2024-01-23 NOTE — OP NOTE
ORTHOPEDIC OPERATIVE NOTE    Name: Sraa Uribe  : 1975  Surgeon: Dorian Mendoza DO  Facility: [facility]  Date of Surgery: 24     SURGEON:    Dorian Mendoza DO  ASSISTANT:    None  PRE OP DIAGNOSIS:  Left lateral epicondylitis  POST OP DIAGNOSIS:  Same  PROCEDURE:   Left lateral epicondyle TOPAZ procedure   ANESTHESIA:   Local with IV Sedation  BLOOD LOSS: Minimal    INDICATIONS: This is a 48 year old female with months of pain on her left lateral side of her elbow.  She was seen in clinic, had undergone conservative care including a shot, brace and NSAIDs.  She had a similar issue on her right, after 3 shots she has surgery and did well.  After getting one shot with relief but symptoms returning, she inquired on surgical intervention on the left which I found to be reasonable given her success on the right. I discussed the diagnosis and treatment options with the patient today along with their associated risks and benefits. After thorough discussion, the patient has elected to proceed with surgical intervention. The patient understands the risks of,including, but not limited to, bleeding, infection, loss of life or limb, pain, permanent numbness, tingling, weakness, loss of motion, failure of the goals of surgery or the potential need for additional surgery. The patient would like to accept these risks and proceed. All questions were answered to the patients satisfaction and the patient seems satisfied with the plan.       PROCEDURE: : Patient was taken back to the operative suite.   IV sedation was administered by the Department of Anesthesia and I injected combination Xylocaine and Marcaine  totaling 10 cc around area of planed incision over the lateral epicondyle. The hand was prepped and draped in the usual manner. The procedure was performed under tourniquet control.  An esmarch was used to exsanguinate the extremity and tourniquet inflated to 250 mm Hg.      A longitudinal  incision was made over the lateral epicondyle about 4 cm in length with a scalpel. Dissection was taken down to the common extensor origin using littler's scissors. Blunt dissection was used to identify its margins. With the probe at good flow, a grid like pattern made at variable depths over an area of 4 cm x 3 cm over the lateral epicondyle.    The tourniquet was released. The wound was irrigated. Hemostasis satisfactory. Deep tissues closed with 4-0 vicryl.  The skin was closed with interrupted 3-0 Nylon suture. Soft bulky dressing was applied. The patient was taken to the recovery room in satisfactory condition.     Electronically signed  Dorian Mendoza DO

## 2024-01-31 NOTE — ANESTHESIA PROCEDURE NOTES
Peripheral Block    Reason for block: post-op pain management  Staffing  Performed: attending   Authorized by: KELLEE Horton    Performed by: KELLEE Horton  Peripheral Block  Injection technique: single-shot  Additional Notes  This was a local per surgeon case, no block placed

## 2024-02-05 ENCOUNTER — OFFICE VISIT (OUTPATIENT)
Dept: ORTHOPEDIC SURGERY | Facility: CLINIC | Age: 49
End: 2024-02-05
Payer: COMMERCIAL

## 2024-02-05 DIAGNOSIS — M77.12 LATERAL EPICONDYLITIS OF LEFT ELBOW: ICD-10-CM

## 2024-02-05 DIAGNOSIS — M77.12 LATERAL EPICONDYLITIS OF LEFT ELBOW: Primary | ICD-10-CM

## 2024-02-05 PROCEDURE — 99024 POSTOP FOLLOW-UP VISIT: CPT | Performed by: ORTHOPAEDIC SURGERY

## 2024-02-05 NOTE — PROGRESS NOTES
48 y.o. female presents today for for follow up after left lateral epicondyle TOPAZ procedure. The patient reports doing well, pain 50% better but elbow feels stiff The DOS was 2 weeks ago. Pain is controlled. Patient denies numbness and tingling.     Review of Systems    Constitutional: see HPI, no fever, no chills, not feeling tired, no significant weight gain or weight loss.   Eyes: No vision changes  ENT: no nosebleeds.   Cardiovascular: no chest pain.   Respiratory: no shortness of breath and no cough.   Gastrointestinal: no abdominal pain, no nausea, no vomiting and no diarrhea.   Musculoskeletal: per HPI  Neurological: no headache, no gait disturbances  Psychiatric: no depression and no sleep disturbances.   Endocrine: no muscle weakness and no muscle cramps.   Hematologic/Lymphatic: no swollen glands and no tendency for easy bruising or excessive swelling.     Patient's past medical history, past surgical history, allergies, and medications have been reviewed unless otherwise noted in the chart.     Elbow Post-Op Exam  Inspection:  no evidence of infection, no erythema, Palpation:  compartments are soft, Range of Motion:  F/E , S/P 90/90 Stability:  stable Strength:  5/5 F/E elbow, 5/5 S/P Skin:  incision site clean, dry and intact, healing without complication, Vascular:  capillary refill <2 seconds distally, Sensation:  intact to light touch distally.    Constitutional   General appearance: Alert and in no acute distress. Well developed, well nourished.    Eyes   External Eye, Conjunctiva and lids: Normal external exam - pupils were equal in size, round, reactive to light (PERRL) with normal accommodation and extraocular movements intact (EOMI).   Ears, Nose, Mouth, and Throat   Hearing: Normal.   Neck   Neck: No neck mass was observed. Supple.   Pulmonary   Respiratory effort: No respiratory distress.   Cardiovascular   Examination of extremities: No peripheral edema.   Psychiatric   Judgment and  insight: Intact.   Orientation to person, place, and time: Alert and oriented x 3.       Mood and affect: Normal.      2 weeks s/p left lateral epicondyle TOPAZ procedure  Based on the history, physical exam and imaging studies above, the patient's presentation is consistent with the above diagnosis.  I had a long discussion with the patient regarding their presentation and the treatment options.  We discussed initial nonoperative versus operative management options as well as potential further diagnostic imaging.  We again discussed her treatment options going forward along with their associated risks and benefits. After thorough discussion, the patient has elected to proceed with conservative management. All questions were answered to the patients satisfaction who seems satisfied with the plan.  They will call the office with any questions/concerns.    Sutures removed  Steris  Vitamin E cream prn to scar tissue  Activities as tolerated  OT - E/T  FU 3 months - no XR

## 2024-02-06 DIAGNOSIS — F32.A ANXIETY AND DEPRESSION: ICD-10-CM

## 2024-02-06 DIAGNOSIS — F41.9 ANXIETY AND DEPRESSION: ICD-10-CM

## 2024-02-06 RX ORDER — BREXPIPRAZOLE 0.5 MG/1
0.5 TABLET ORAL NIGHTLY
Qty: 90 TABLET | Refills: 3 | Status: SHIPPED | OUTPATIENT
Start: 2024-02-06 | End: 2024-05-21 | Stop reason: WASHOUT

## 2024-02-13 ENCOUNTER — EVALUATION (OUTPATIENT)
Dept: OCCUPATIONAL THERAPY | Facility: HOSPITAL | Age: 49
End: 2024-02-13
Payer: COMMERCIAL

## 2024-02-13 DIAGNOSIS — M25.622 ELBOW STIFFNESS, LEFT: ICD-10-CM

## 2024-02-13 DIAGNOSIS — R29.898 LEFT ARM WEAKNESS: ICD-10-CM

## 2024-02-13 DIAGNOSIS — M77.12 LATERAL EPICONDYLITIS OF LEFT ELBOW: Primary | ICD-10-CM

## 2024-02-13 DIAGNOSIS — M25.521 ELBOW PAIN, RIGHT: ICD-10-CM

## 2024-02-13 PROCEDURE — 97166 OT EVAL MOD COMPLEX 45 MIN: CPT | Mod: GO

## 2024-02-13 PROCEDURE — 97110 THERAPEUTIC EXERCISES: CPT | Mod: GO

## 2024-02-13 ASSESSMENT — PATIENT HEALTH QUESTIONNAIRE - PHQ9
7. TROUBLE CONCENTRATING ON THINGS, SUCH AS READING THE NEWSPAPER OR WATCHING TELEVISION: MORE THAN HALF THE DAYS
2. FEELING DOWN, DEPRESSED OR HOPELESS: NEARLY EVERY DAY
9. THOUGHTS THAT YOU WOULD BE BETTER OFF DEAD, OR OF HURTING YOURSELF: NOT AT ALL
4. FEELING TIRED OR HAVING LITTLE ENERGY: NEARLY EVERY DAY
3. TROUBLE FALLING OR STAYING ASLEEP OR SLEEPING TOO MUCH: NEARLY EVERY DAY
SUM OF ALL RESPONSES TO PHQ9 QUESTIONS 1 AND 2: 5
10. IF YOU CHECKED OFF ANY PROBLEMS, HOW DIFFICULT HAVE THESE PROBLEMS MADE IT FOR YOU TO DO YOUR WORK, TAKE CARE OF THINGS AT HOME, OR GET ALONG WITH OTHER PEOPLE: NOT DIFFICULT AT ALL
SUM OF ALL RESPONSES TO PHQ QUESTIONS 1-9: 16
5. POOR APPETITE OR OVEREATING: SEVERAL DAYS
1. LITTLE INTEREST OR PLEASURE IN DOING THINGS: MORE THAN HALF THE DAYS
6. FEELING BAD ABOUT YOURSELF - OR THAT YOU ARE A FAILURE OR HAVE LET YOURSELF OR YOUR FAMILY DOWN: MORE THAN HALF THE DAYS
8. MOVING OR SPEAKING SO SLOWLY THAT OTHER PEOPLE COULD HAVE NOTICED. OR THE OPPOSITE, BEING SO FIGETY OR RESTLESS THAT YOU HAVE BEEN MOVING AROUND A LOT MORE THAN USUAL: NOT AT ALL

## 2024-02-13 ASSESSMENT — PAIN SCALES - GENERAL: PAINLEVEL_OUTOF10: 5 - MODERATE PAIN

## 2024-02-13 ASSESSMENT — ENCOUNTER SYMPTOMS
LOSS OF SENSATION IN FEET: 0
OCCASIONAL FEELINGS OF UNSTEADINESS: 0
DEPRESSION: 1

## 2024-02-13 ASSESSMENT — PAIN - FUNCTIONAL ASSESSMENT: PAIN_FUNCTIONAL_ASSESSMENT: 0-10

## 2024-02-13 NOTE — PROGRESS NOTES
Occupational Therapy    Occupational Therapy Evaluation  Visit: 1  Name: Sara Uribe  MRN: [id number]  : 1975  Date: 24     DOI: 05/15/24  DOS: 24    Therapeutic Procedure Codes:          Assessment:     Patient is a 47 yo female who presents to this facility with performance deficits in ROM, strength and function following a Tenex Procedure on her L elbow. Pt present s to therapy holding her arm in a significant protect mode in her attempts to control pain. Pts muscle tension and protective positioning likely contributing to tightness and pain. Pt admits she tends to be apprehensive re movement and tends to hold her elbow bent near 90 deg and locked into her  waist.  Movement was stiff and limited with the pt demonstrating and reporting pain. Pt provided with moist heat with her arm in some extension and instruction to rest and relax her tone. Pt following heat was asked to begin  to allow her elbow to extend at her side. A graded approach to elbow extension seemed to offer the pt the least stress and pain. Pt encouraged to work on her ROM with some consistency recommending 4-8 times a day. Pt to avoid pushing pain. Pt likewise instructed in wrist ROM all planes incorporating AAROM as well. Pt moved slowly still with significant limits but more relaxed and less pain noted.    Plan:   Occupational therapy intervention plan to include education/instruction, electrical stimulation, hot pack, ultrasound, manual therapy, neuromuscular re-education, orthotic fitting/training, self-care/home management, therapeutic exercises, therapeutic activities, and home program.       Subjective   Current Problem:  1. Lateral epicondylitis of left elbow  Referral to Occupational Therapy    Follow Up In Occupational Therapy      2. Elbow pain, right        3. Left arm weakness        4. Elbow stiffness, left          General:   Pt reports a hx of increasing L elbow pain. Had to put off any significant  intervention until her mom was adequately recovered from  foot surgery. Pt reports that prior top intervention any rina of lifting gripping pinching would cause pain. Tyenex procedure completed on 1/23/24   . Pt rfepolrts she now reports significant limits in extension and flexion. Pt also has limitations in werist and finger motion secondary top pain./  L Dom/ L arm suirg  Pain: Worst in the last 3 days 7/10, throbbing, will ice and use tylenol. Best 5/10.  Disabled, not working but likes to do wood working and likes video games.  Mobile home with a room mate. Pt manages all ADLs and IADLs as well as her service dog normally. Does not see her room mate much.  Pt managing basic ADLs and IADLs with adaptation and extra time. Pt putting off most cleaning and is not folding laundry.    Goal for OT: Get back to normal routine         Precautions:     Precautions Comment: Tolerance (3 weeks post procedure today)    I have reviewed the patients medical history form.   Pain Assessment:  Pain Assessment  Pain Assessment: 0-10  Pain Score: 5 - Moderate pain  Pain Type: Acute pain  Pain Location: Elbow  Pain Orientation: Outer    Objective   Elbow ROM:  WFL unless documented below   Flexion Extension  Supination Pronation   Right 130 -20 65 75   Left 110 -65 55        75     Wrist Measurements:  WFL unless documented below   Flexion  Extension Radial Dev Ulnar Dev Supination Pronation   Right 60 50 30 45     Left 25 30 20 20                 Home Living:   See General Section above.    Prior Function Per Pt/Caregiver Report:     Pt reporting IND with ADL and IADL tasks.    IADL History:    See General Section above.  Work: Disability    ADL:    See General Section above.     Outcome Measures:     Quickdash Scores: 59.09%    OP EDUCATION:  Education  Individual(s) Educated: Patient  Education Provided: Anatomy & Physiology, Diagnosis & Precautions, Orthotics wearing schedule and precautions, Symptom management  Home Program:  PROM, AAROM, AROM, Modalities    Exercises: Reps:   Passive stretch with moist heat Extension   AROM Elbow to tolerance/ Wrist   AAROM Elbow as works easiest, wrist   PROM Elbow as works easiest, wrist           Activities:                    Modalities:                    Manual:    Edema control Compressive sleeve/ Ice encouraged               Functional review:  Elbow, wrist ROM   Completed on: 02/13/24          Goals:  Active       OT Goals       OT Goal 1       Start:  02/13/24    Expected End:  04/23/24       LTG - Patient will indicate/ demonstrate the ability to resume all preinjury ADLs and IADLs without significant limits secondary to decreased ROM, decreased strength and/or pain as indicated by Quickdash score of less than 15%.           OT Goal 2       Start:  02/13/24    Expected End:  04/23/24       Develop and issue HEP to help maximize ROM, strength and tolerance to help maximize return to all pre-onset activities.           OT Goal 3       Start:  02/13/24    Expected End:  04/23/24       Patient will demonstrate a progressive increase in ROM as appropriate with L Elbow to be within 5-10 degrees of the R elbow to help patient resume normal ADL and IADL function.           OT Goal 4       Start:  02/13/24    Expected End:  04/23/24       Pain to be less than or equal to 2/10 with greater than or equal to 45 minutes activity.           OT Goal 5       Start:  02/13/24    Expected End:  04/23/24       Pt will participate in  strength assessment as appropriate and demonstrate increased  strength  with the L  to be greater than or equal to 80% of the R to help patient resume ADLs and IADLs.

## 2024-02-15 ENCOUNTER — DOCUMENTATION (OUTPATIENT)
Dept: OCCUPATIONAL THERAPY | Facility: HOSPITAL | Age: 49
End: 2024-02-15
Payer: COMMERCIAL

## 2024-02-15 NOTE — PROGRESS NOTES
Occupational Therapy                 Therapy Communication Note    Patient Name: Sara Uribe  MRN: [id number]  Today's Date: 2/15/2024     Discipline: Occupational Therapy    Missed Time: No Show    Comment: Called and left message on voicemail.

## 2024-02-16 ENCOUNTER — TELEPHONE (OUTPATIENT)
Dept: PRIMARY CARE | Facility: CLINIC | Age: 49
End: 2024-02-16

## 2024-02-16 DIAGNOSIS — E11.65 TYPE 2 DIABETES MELLITUS WITH HYPERGLYCEMIA, WITHOUT LONG-TERM CURRENT USE OF INSULIN (MULTI): ICD-10-CM

## 2024-02-16 DIAGNOSIS — G35 MULTIPLE SCLEROSIS, RELAPSING-REMITTING (MULTI): ICD-10-CM

## 2024-02-16 DIAGNOSIS — M19.90 ARTHRITIS: Primary | ICD-10-CM

## 2024-02-16 RX ORDER — METFORMIN HYDROCHLORIDE 750 MG/1
750 TABLET, EXTENDED RELEASE ORAL
Qty: 180 TABLET | Refills: 0 | Status: SHIPPED | OUTPATIENT
Start: 2024-02-16 | End: 2024-02-27 | Stop reason: SDUPTHER

## 2024-02-20 ENCOUNTER — DOCUMENTATION (OUTPATIENT)
Dept: OCCUPATIONAL THERAPY | Facility: HOSPITAL | Age: 49
End: 2024-02-20
Payer: COMMERCIAL

## 2024-02-20 NOTE — PROGRESS NOTES
Occupational Therapy                 Therapy Communication Note    Patient Name: Sara rUibe  MRN: [id number]  Today's Date: 2/20/2024     Discipline: Occupational Therapy    Missed Time: No Show    Comment: Called patient and left voice massage on pts phone. This is the patient's 2nd no call and no show since IE with patient not returning any call back yet. Will discuss POC with OTR about patient and no call and no show policy.

## 2024-02-22 ENCOUNTER — APPOINTMENT (OUTPATIENT)
Dept: OCCUPATIONAL THERAPY | Facility: HOSPITAL | Age: 49
End: 2024-02-22
Payer: COMMERCIAL

## 2024-02-27 ENCOUNTER — DOCUMENTATION (OUTPATIENT)
Dept: OCCUPATIONAL THERAPY | Facility: HOSPITAL | Age: 49
End: 2024-02-27
Payer: COMMERCIAL

## 2024-02-27 RX ORDER — METFORMIN HYDROCHLORIDE 750 MG/1
750 TABLET, EXTENDED RELEASE ORAL 2 TIMES DAILY
COMMUNITY

## 2024-02-27 NOTE — PROGRESS NOTES
Occupational Therapy                 Therapy Communication Note    Patient Name: Sara Uribe  MRN: [id number]  Today's Date: 2/27/2024     Discipline: Occupational Therapy    Missed Visit Reason: unknown     Missed Time: No Show

## 2024-02-29 ENCOUNTER — DOCUMENTATION (OUTPATIENT)
Dept: OCCUPATIONAL THERAPY | Facility: HOSPITAL | Age: 49
End: 2024-02-29
Payer: COMMERCIAL

## 2024-02-29 NOTE — PROGRESS NOTES
Occupational Therapy                 Therapy Communication Note    Patient Name: Sara Uribe  MRN: [id number]  Today's Date: 2/29/2024     Discipline: Occupational Therapy    Missed Visit Reason:  Unknown    Missed Time: No Show

## 2024-03-05 ENCOUNTER — DOCUMENTATION (OUTPATIENT)
Dept: OCCUPATIONAL THERAPY | Facility: HOSPITAL | Age: 49
End: 2024-03-05
Payer: COMMERCIAL

## 2024-03-05 NOTE — PROGRESS NOTES
Occupational Therapy                 Therapy Communication Note    Patient Name: Sara Uribe  MRN: [id number]  Today's Date: 3/5/2024     Discipline: Occupational Therapy    Missed Visit Reason: Called pt. Left voice mail to call and update her status and intent to return.     Missed Time: No Show

## 2024-03-07 ENCOUNTER — APPOINTMENT (OUTPATIENT)
Dept: OCCUPATIONAL THERAPY | Facility: HOSPITAL | Age: 49
End: 2024-03-07
Payer: COMMERCIAL

## 2024-03-12 ENCOUNTER — APPOINTMENT (OUTPATIENT)
Dept: OCCUPATIONAL THERAPY | Facility: HOSPITAL | Age: 49
End: 2024-03-12
Payer: COMMERCIAL

## 2024-03-14 ENCOUNTER — APPOINTMENT (OUTPATIENT)
Dept: OCCUPATIONAL THERAPY | Facility: HOSPITAL | Age: 49
End: 2024-03-14
Payer: COMMERCIAL

## 2024-03-19 ENCOUNTER — APPOINTMENT (OUTPATIENT)
Dept: PRIMARY CARE | Facility: CLINIC | Age: 49
End: 2024-03-19
Payer: COMMERCIAL

## 2024-05-21 ENCOUNTER — OFFICE VISIT (OUTPATIENT)
Dept: PRIMARY CARE | Facility: CLINIC | Age: 49
End: 2024-05-21
Payer: COMMERCIAL

## 2024-05-21 VITALS
RESPIRATION RATE: 17 BRPM | BODY MASS INDEX: 32.61 KG/M2 | OXYGEN SATURATION: 97 % | HEART RATE: 98 BPM | DIASTOLIC BLOOD PRESSURE: 84 MMHG | WEIGHT: 220.2 LBS | SYSTOLIC BLOOD PRESSURE: 120 MMHG | HEIGHT: 69 IN

## 2024-05-21 DIAGNOSIS — E78.5 BORDERLINE HYPERLIPIDEMIA: ICD-10-CM

## 2024-05-21 DIAGNOSIS — G81.91 RIGHT HEMIPARESIS (MULTI): ICD-10-CM

## 2024-05-21 DIAGNOSIS — Z13.6 SCREENING FOR CARDIOVASCULAR CONDITION: ICD-10-CM

## 2024-05-21 DIAGNOSIS — G40.909 NONINTRACTABLE EPILEPSY WITHOUT STATUS EPILEPTICUS, UNSPECIFIED EPILEPSY TYPE (MULTI): ICD-10-CM

## 2024-05-21 DIAGNOSIS — F31.32 BIPOLAR AFFECTIVE DISORDER, CURRENTLY DEPRESSED, MODERATE (MULTI): ICD-10-CM

## 2024-05-21 DIAGNOSIS — Z00.00 ROUTINE GENERAL MEDICAL EXAMINATION AT HEALTH CARE FACILITY: Primary | ICD-10-CM

## 2024-05-21 DIAGNOSIS — F32.A ANXIETY AND DEPRESSION: ICD-10-CM

## 2024-05-21 DIAGNOSIS — Z12.31 VISIT FOR SCREENING MAMMOGRAM: ICD-10-CM

## 2024-05-21 DIAGNOSIS — R13.19 ESOPHAGEAL DYSPHAGIA: ICD-10-CM

## 2024-05-21 DIAGNOSIS — E11.65 TYPE 2 DIABETES MELLITUS WITH HYPERGLYCEMIA, WITHOUT LONG-TERM CURRENT USE OF INSULIN (MULTI): ICD-10-CM

## 2024-05-21 DIAGNOSIS — F41.9 ANXIETY AND DEPRESSION: ICD-10-CM

## 2024-05-21 DIAGNOSIS — Z86.39 H/O HYPERGLYCEMIA: ICD-10-CM

## 2024-05-21 DIAGNOSIS — D84.9 IMMUNOCOMPROMISED, ACQUIRED (MULTI): ICD-10-CM

## 2024-05-21 DIAGNOSIS — G35 MULTIPLE SCLEROSIS, RELAPSING-REMITTING (MULTI): ICD-10-CM

## 2024-05-21 PROBLEM — B07.0 PLANTAR WART: Status: ACTIVE | Noted: 2024-05-21

## 2024-05-21 PROCEDURE — 4004F PT TOBACCO SCREEN RCVD TLK: CPT | Performed by: FAMILY MEDICINE

## 2024-05-21 PROCEDURE — G0439 PPPS, SUBSEQ VISIT: HCPCS | Performed by: FAMILY MEDICINE

## 2024-05-21 PROCEDURE — 3074F SYST BP LT 130 MM HG: CPT | Performed by: FAMILY MEDICINE

## 2024-05-21 PROCEDURE — 3079F DIAST BP 80-89 MM HG: CPT | Performed by: FAMILY MEDICINE

## 2024-05-21 PROCEDURE — 99214 OFFICE O/P EST MOD 30 MIN: CPT | Performed by: FAMILY MEDICINE

## 2024-05-21 RX ORDER — DULOXETIN HYDROCHLORIDE 30 MG/1
30 CAPSULE, DELAYED RELEASE ORAL DAILY
Qty: 30 CAPSULE | Refills: 11 | Status: SHIPPED | OUTPATIENT
Start: 2024-05-21 | End: 2025-05-21

## 2024-05-21 RX ORDER — DULOXETIN HYDROCHLORIDE 60 MG/1
60 CAPSULE, DELAYED RELEASE ORAL DAILY
Qty: 30 CAPSULE | Refills: 11 | Status: SHIPPED | OUTPATIENT
Start: 2024-05-21 | End: 2025-05-21

## 2024-05-21 RX ORDER — BACLOFEN 10 MG/1
10-20 TABLET ORAL 3 TIMES DAILY
Qty: 270 TABLET | Refills: 3 | Status: SHIPPED | OUTPATIENT
Start: 2024-05-21

## 2024-05-21 ASSESSMENT — ENCOUNTER SYMPTOMS
OCCASIONAL FEELINGS OF UNSTEADINESS: 0
LOSS OF SENSATION IN FEET: 0
ARTHRALGIAS: 1
DEPRESSION: 0
DYSPHORIC MOOD: 1

## 2024-05-21 ASSESSMENT — ANXIETY QUESTIONNAIRES
4. TROUBLE RELAXING: MORE THAN HALF THE DAYS
7. FEELING AFRAID AS IF SOMETHING AWFUL MIGHT HAPPEN: SEVERAL DAYS
2. NOT BEING ABLE TO STOP OR CONTROL WORRYING: NOT AT ALL
1. FEELING NERVOUS, ANXIOUS, OR ON EDGE: NOT AT ALL
6. BECOMING EASILY ANNOYED OR IRRITABLE: SEVERAL DAYS
5. BEING SO RESTLESS THAT IT IS HARD TO SIT STILL: SEVERAL DAYS
3. WORRYING TOO MUCH ABOUT DIFFERENT THINGS: NOT AT ALL
GAD7 TOTAL SCORE: 5

## 2024-05-21 ASSESSMENT — PATIENT HEALTH QUESTIONNAIRE - PHQ9
9. THOUGHTS THAT YOU WOULD BE BETTER OFF DEAD, OR OF HURTING YOURSELF: NOT AT ALL
6. FEELING BAD ABOUT YOURSELF - OR THAT YOU ARE A FAILURE OR HAVE LET YOURSELF OR YOUR FAMILY DOWN: SEVERAL DAYS
1. LITTLE INTEREST OR PLEASURE IN DOING THINGS: MORE THAN HALF THE DAYS
2. FEELING DOWN, DEPRESSED OR HOPELESS: NOT AT ALL
SUM OF ALL RESPONSES TO PHQ QUESTIONS 1-9: 12
2. FEELING DOWN, DEPRESSED OR HOPELESS: MORE THAN HALF THE DAYS
5. POOR APPETITE OR OVEREATING: SEVERAL DAYS
8. MOVING OR SPEAKING SO SLOWLY THAT OTHER PEOPLE COULD HAVE NOTICED. OR THE OPPOSITE, BEING SO FIGETY OR RESTLESS THAT YOU HAVE BEEN MOVING AROUND A LOT MORE THAN USUAL: MORE THAN HALF THE DAYS
SUM OF ALL RESPONSES TO PHQ9 QUESTIONS 1 AND 2: 0
4. FEELING TIRED OR HAVING LITTLE ENERGY: MORE THAN HALF THE DAYS
SUM OF ALL RESPONSES TO PHQ9 QUESTIONS 1 AND 2: 4
10. IF YOU CHECKED OFF ANY PROBLEMS, HOW DIFFICULT HAVE THESE PROBLEMS MADE IT FOR YOU TO DO YOUR WORK, TAKE CARE OF THINGS AT HOME, OR GET ALONG WITH OTHER PEOPLE: SOMEWHAT DIFFICULT
3. TROUBLE FALLING OR STAYING ASLEEP OR SLEEPING TOO MUCH: SEVERAL DAYS
7. TROUBLE CONCENTRATING ON THINGS, SUCH AS READING THE NEWSPAPER OR WATCHING TELEVISION: SEVERAL DAYS
1. LITTLE INTEREST OR PLEASURE IN DOING THINGS: NOT AT ALL

## 2024-05-21 ASSESSMENT — COLUMBIA-SUICIDE SEVERITY RATING SCALE - C-SSRS
2. HAVE YOU ACTUALLY HAD ANY THOUGHTS OF KILLING YOURSELF?: NO
6. HAVE YOU EVER DONE ANYTHING, STARTED TO DO ANYTHING, OR PREPARED TO DO ANYTHING TO END YOUR LIFE?: NO
1. IN THE PAST MONTH, HAVE YOU WISHED YOU WERE DEAD OR WISHED YOU COULD GO TO SLEEP AND NOT WAKE UP?: NO

## 2024-05-21 NOTE — PROGRESS NOTES
"Subjective   Reason for Visit: Sara Uribe is an 49 y.o. female here for a Medicare Wellness visit.   Hurts to swallow, cannot tolerate food. Wife was sleeping with someone else. She has now left. (3/2024) Meds have stayed the same,    No evidence of hot flashes, does not get periods due to ablation. Has three lizards and a dog.   Is a bit depressed due to breakup,   Reviewed all medications by prescribing practitioner or clinical pharmacist (such as prescriptions, OTCs, herbal therapies and supplements) and documented in the medical record.    HPI  49 year old female for follow up. It has been quite a while since she has been here.   Patient Care Team:  Julia Molina MD as PCP - General  Julia Molina MD as PCP - [facility] [employer] PCP     Review of Systems   Musculoskeletal:  Positive for arthralgias.   Psychiatric/Behavioral:  Positive for dysphoric mood.    All other systems reviewed and are negative.  Last EGD 2020 Normal     Objective   Vitals:  /84   Pulse 98   Resp 17   Ht 1.753 m (5' 9\")   Wt 99.9 kg (220 lb 3.2 oz)   SpO2 97%   BMI 32.52 kg/m²       Physical Exam  Vitals reviewed.   Constitutional:       Appearance: Normal appearance.   HENT:      Head: Normocephalic and atraumatic.      Right Ear: Tympanic membrane normal.      Left Ear: Tympanic membrane normal.      Nose: Nose normal.      Mouth/Throat:      Mouth: Mucous membranes are moist.      Pharynx: Oropharynx is clear.      Comments: Poor dentition  Eyes:      Extraocular Movements: Extraocular movements intact.      Conjunctiva/sclera: Conjunctivae normal.      Pupils: Pupils are equal, round, and reactive to light.   Cardiovascular:      Rate and Rhythm: Normal rate and regular rhythm.      Pulses: Normal pulses.      Heart sounds: Normal heart sounds.   Pulmonary:      Effort: Pulmonary effort is normal.      Breath sounds: Normal breath sounds.   Abdominal:      General: Abdomen is flat. Bowel " sounds are normal.      Palpations: Abdomen is soft.   Musculoskeletal:         General: Normal range of motion.      Cervical back: Normal range of motion and neck supple.   Skin:     General: Skin is warm and dry.      Capillary Refill: Capillary refill takes less than 2 seconds.   Neurological:      General: No focal deficit present.      Mental Status: She is alert and oriented to person, place, and time.   Psychiatric:         Mood and Affect: Mood normal.         Behavior: Behavior normal.         Assessment/Plan   Problem List Items Addressed This Visit       Anxiety and depression    Dysphagia    Relevant Orders    Referral to Gastroenterology    Multiple sclerosis, relapsing-remitting (Multi)    Relevant Medications    baclofen (Lioresal) 10 mg tablet    Other Relevant Orders    CBC and Auto Differential    Type 2 diabetes mellitus with hyperglycemia, without long-term current use of insulin (Multi)    Relevant Orders    Hemoglobin A1C    Right hemiparesis (Multi)    Relevant Orders    TSH with reflex to Free T4 if abnormal    Immunocompromised, acquired (Multi)    Bipolar disorder (Multi)    Relevant Medications    brexpiprazole (Rexulti) 1 mg tablet    DULoxetine (Cymbalta) 60 mg DR capsule    DULoxetine (Cymbalta) 30 mg DR capsule    Other Relevant Orders    Comprehensive Metabolic Panel    Epilepsy, unspecified, not intractable, without status epilepticus (Multi)    Relevant Orders    Comprehensive Metabolic Panel    Lipid Panel    TSH with reflex to Free T4 if abnormal    Visit for screening mammogram - Primary    Relevant Orders    BI mammo bilateral screening tomosynthesis     Other Visit Diagnoses       Borderline hyperlipidemia        Relevant Orders    Lipid Panel    H/O hyperglycemia        Relevant Orders    TSH with reflex to Free T4 if abnormal          With depression getting worse, would like to adjust medications. 1) Increased duloxetine to 90 mg per day. 2) Increase rexulti to 1mg  Okay to  take Baclofen 1-2 tabs every eight hours. Continue aubagio  Ordered  additional testing for you'  Referred to GI again due to swallowing to make sure you do not have thrush.      Patient was identified as a fall risk. Risk prevention instructions provided.

## 2024-05-21 NOTE — PATIENT INSTRUCTIONS
Assessment/Plan   Problem List Items Addressed This Visit       Anxiety and depression    Dysphagia    Relevant Orders    Referral to Gastroenterology    Multiple sclerosis, relapsing-remitting (Multi)    Relevant Medications    baclofen (Lioresal) 10 mg tablet    Other Relevant Orders    CBC and Auto Differential    Type 2 diabetes mellitus with hyperglycemia, without long-term current use of insulin (Multi)    Relevant Orders    Hemoglobin A1C    Right hemiparesis (Multi)    Relevant Orders    TSH with reflex to Free T4 if abnormal    Immunocompromised, acquired (Multi)    Bipolar disorder (Multi)    Relevant Medications    brexpiprazole (Rexulti) 1 mg tablet    DULoxetine (Cymbalta) 60 mg DR capsule    DULoxetine (Cymbalta) 30 mg DR capsule    Other Relevant Orders    Comprehensive Metabolic Panel    Epilepsy, unspecified, not intractable, without status epilepticus (Multi)    Relevant Orders    Comprehensive Metabolic Panel    Lipid Panel    TSH with reflex to Free T4 if abnormal    Visit for screening mammogram - Primary    Relevant Orders    BI mammo bilateral screening tomosynthesis     Other Visit Diagnoses       Borderline hyperlipidemia        Relevant Orders    Lipid Panel    H/O hyperglycemia        Relevant Orders    TSH with reflex to Free T4 if abnormal          With depression getting worse, would like to adjust medications. 1) Increased duloxetine to 90 mg per day. 2) Increase rexulti to 1mg  Okay to take Baclofen 1-2 tabs every eight hours. Continue aubagio  Ordered  additional testing for you'  Referred to GI again due to swallowing to make sure you do not have thrush.      Patient was identified as a fall risk. Risk prevention instructions provided.      Ways to Help Prevent Falls at Home    Quick Tips   ? Ask for help if you need it. Most people want to help!   ? Get up slowly after sitting or laying down   ? Wear a medical alert device or keep cell phone in your pocket   ? Use night lights,  especially areas near a bathroom   ? Keep the items you use often within reach on a small stool or end table   ? Use an assistive device such as walker or cane, as directed by provider/physical therapy   ? Use a non-slip mat and grab bars in your bathroom. Look for home health sections for best options     Other Areas to Focus On   ? Exercise and nutrition: Regular exercise or taking a falls prevention class are great ways improve strength and balance. Don’t forget to stay hydrated and bring a snack!   ? Medicine side effects: Some medicines can make you sleepy or dizzy, which could cause a fall. Ask your healthcare provider about the side effects your medicines could cause. Be sure to let them know if you take any vitamins or supplements as well.   ? Tripping hazards: Remove items you could trip on, such as loose mats, rugs, cords, and clutter. Wear closed toe shoes with rubber soles.   ? Health and wellness: Get regular checkups with your healthcare provider, plus routine vision and hearing screenings. Talk with your healthcare provider about:   o Your medicines and the possible side effects - bring them in a bag if that is easier!   o Problems with balance or feeling dizzy   o Ways to promote bone health, such as Vitamin D and calcium supplements   o Questions or concerns about falling     *Ask your healthcare team if you have questions     ©[facility], 2022

## 2024-06-17 ENCOUNTER — APPOINTMENT (OUTPATIENT)
Dept: PRIMARY CARE | Facility: CLINIC | Age: 49
End: 2024-06-17
Payer: COMMERCIAL

## 2024-06-25 DIAGNOSIS — E53.8 B12 DEFICIENCY: Primary | ICD-10-CM

## 2024-06-26 ENCOUNTER — TELEPHONE (OUTPATIENT)
Dept: NEUROLOGY | Facility: HOSPITAL | Age: 49
End: 2024-06-26
Payer: COMMERCIAL

## 2024-06-26 NOTE — TELEPHONE ENCOUNTER
----- Message from Gulshan Kaur MD sent at 6/25/2024  2:10 PM EDT -----  Regarding: RE: Was pt supposed to have a B12 level redrawn in March?  I don't see an order or a result.  Chart prep.  Thank you.  Yes. Not sure where it went. I wrote down I ordered it. Reordered today. thanks    Dr kaur    ----- Message -----  From: Pari Cervantes RN  Sent: 6/25/2024   1:49 PM EDT  To: Gulshan Kaur MD  Subject: Was pt supposed to have a B12 level redrawn #

## 2024-06-26 NOTE — TELEPHONE ENCOUNTER
Pt notified to have B12 drawn pre July 2 appt.  Verbalized understanding and cooperation with plan.

## 2024-06-27 DIAGNOSIS — G35 MULTIPLE SCLEROSIS, RELAPSING-REMITTING (MULTI): Primary | ICD-10-CM

## 2024-07-01 RX ORDER — TERIFLUNOMIDE 14 MG/1
TABLET, FILM COATED ORAL DAILY
Qty: 30 TABLET | Refills: 1 | Status: SHIPPED | OUTPATIENT
Start: 2024-07-01

## 2024-07-01 NOTE — TELEPHONE ENCOUNTER
I called pt to see what the Aubagio status is currently.  [facility]    Next, I called [facility] and [facility] ([facility] neither of which have received a Rx.    Will wait to hear from pt.

## 2024-07-02 ENCOUNTER — APPOINTMENT (OUTPATIENT)
Dept: NEUROLOGY | Facility: HOSPITAL | Age: 49
End: 2024-07-02
Payer: COMMERCIAL

## 2024-07-08 ENCOUNTER — APPOINTMENT (OUTPATIENT)
Dept: NEUROLOGY | Facility: HOSPITAL | Age: 49
End: 2024-07-08
Payer: COMMERCIAL

## 2024-07-31 DIAGNOSIS — F31.32 BIPOLAR AFFECTIVE DISORDER, CURRENTLY DEPRESSED, MODERATE (MULTI): ICD-10-CM

## 2024-07-31 RX ORDER — DULOXETIN HYDROCHLORIDE 60 MG/1
60 CAPSULE, DELAYED RELEASE ORAL DAILY
Qty: 90 CAPSULE | Refills: 3 | Status: SHIPPED | OUTPATIENT
Start: 2024-07-31 | End: 2025-07-31

## 2024-07-31 RX ORDER — DULOXETIN HYDROCHLORIDE 30 MG/1
30 CAPSULE, DELAYED RELEASE ORAL DAILY
Qty: 90 CAPSULE | Refills: 3 | Status: ON HOLD | OUTPATIENT
Start: 2024-07-31 | End: 2024-08-05

## 2024-08-04 ENCOUNTER — APPOINTMENT (OUTPATIENT)
Dept: RADIOLOGY | Facility: HOSPITAL | Age: 49
End: 2024-08-04
Payer: COMMERCIAL

## 2024-08-04 ENCOUNTER — HOSPITAL ENCOUNTER (INPATIENT)
Facility: HOSPITAL | Age: 49
End: 2024-08-04
Admitting: INTERNAL MEDICINE
Payer: COMMERCIAL

## 2024-08-04 ENCOUNTER — APPOINTMENT (OUTPATIENT)
Dept: CARDIOLOGY | Facility: HOSPITAL | Age: 49
End: 2024-08-04
Payer: COMMERCIAL

## 2024-08-04 VITALS
HEART RATE: 108 BPM | SYSTOLIC BLOOD PRESSURE: 122 MMHG | TEMPERATURE: 97.2 F | HEIGHT: 69 IN | DIASTOLIC BLOOD PRESSURE: 74 MMHG | BODY MASS INDEX: 32.58 KG/M2 | OXYGEN SATURATION: 95 % | RESPIRATION RATE: 20 BRPM | WEIGHT: 220 LBS

## 2024-08-04 DIAGNOSIS — G35 MULTIPLE SCLEROSIS EXACERBATION (MULTI): Primary | ICD-10-CM

## 2024-08-04 DIAGNOSIS — F31.32 BIPOLAR AFFECTIVE DISORDER, CURRENTLY DEPRESSED, MODERATE (MULTI): ICD-10-CM

## 2024-08-04 PROBLEM — M77.12 LATERAL EPICONDYLITIS OF LEFT ELBOW: Status: RESOLVED | Noted: 2024-01-04 | Resolved: 2024-08-04

## 2024-08-04 PROBLEM — R94.31 ABNORMAL EKG: Status: RESOLVED | Noted: 2023-05-23 | Resolved: 2024-08-04

## 2024-08-04 PROBLEM — M51.9 INTERVERTEBRAL DISC DISORDER: Status: RESOLVED | Noted: 2020-09-09 | Resolved: 2024-08-04

## 2024-08-04 PROBLEM — S92.034A CLOSED NONDISPLACED AVULSION FRACTURE OF TUBEROSITY OF RIGHT CALCANEUS: Status: RESOLVED | Noted: 2023-05-23 | Resolved: 2024-08-04

## 2024-08-04 PROBLEM — B07.0 PLANTAR WART: Status: RESOLVED | Noted: 2024-05-21 | Resolved: 2024-08-04

## 2024-08-04 PROBLEM — J30.9 ALLERGIC RHINITIS: Status: RESOLVED | Noted: 2023-05-23 | Resolved: 2024-08-04

## 2024-08-04 PROBLEM — G62.9 POLYNEUROPATHY: Status: RESOLVED | Noted: 2020-09-09 | Resolved: 2024-08-04

## 2024-08-04 PROBLEM — B02.9 SHINGLES: Status: RESOLVED | Noted: 2023-05-23 | Resolved: 2024-08-04

## 2024-08-04 PROBLEM — H10.13 ALLERGIC CONJUNCTIVITIS OF BOTH EYES: Status: RESOLVED | Noted: 2023-05-23 | Resolved: 2024-08-04

## 2024-08-04 PROBLEM — G62.9 PERIPHERAL NEUROPATHY: Status: RESOLVED | Noted: 2023-05-23 | Resolved: 2024-08-04

## 2024-08-04 PROBLEM — H00.025 HORDEOLUM INTERNUM OF LEFT LOWER EYELID: Status: RESOLVED | Noted: 2023-05-23 | Resolved: 2024-08-04

## 2024-08-04 PROBLEM — R10.32 ABDOMINAL PAIN, CHRONIC, LEFT LOWER QUADRANT: Status: RESOLVED | Noted: 2023-05-23 | Resolved: 2024-08-04

## 2024-08-04 PROBLEM — F45.8 FUNCTIONAL VISUAL LOSS: Status: RESOLVED | Noted: 2023-05-23 | Resolved: 2024-08-04

## 2024-08-04 PROBLEM — R94.39 ABNORMAL STRESS ELECTROCARDIOGRAM TEST: Status: RESOLVED | Noted: 2023-05-23 | Resolved: 2024-08-04

## 2024-08-04 PROBLEM — K59.09 CHRONIC CONSTIPATION: Status: RESOLVED | Noted: 2023-05-23 | Resolved: 2024-08-04

## 2024-08-04 PROBLEM — M19.90 ARTHRITIS: Status: RESOLVED | Noted: 2023-05-23 | Resolved: 2024-08-04

## 2024-08-04 PROBLEM — R42 DIZZINESS: Status: RESOLVED | Noted: 2023-11-08 | Resolved: 2024-08-04

## 2024-08-04 PROBLEM — G43.909 MIGRAINE HEADACHE: Status: ACTIVE | Noted: 2024-08-04

## 2024-08-04 PROBLEM — R26.89 BALANCE PROBLEM: Status: RESOLVED | Noted: 2023-05-23 | Resolved: 2024-08-04

## 2024-08-04 PROBLEM — M77.11 LATERAL EPICONDYLITIS OF RIGHT ELBOW: Status: RESOLVED | Noted: 2023-05-23 | Resolved: 2024-08-04

## 2024-08-04 PROBLEM — S53.401A ELBOW SPRAIN, RIGHT, INITIAL ENCOUNTER: Status: RESOLVED | Noted: 2023-05-23 | Resolved: 2024-08-04

## 2024-08-04 PROBLEM — M47.26 OTHER SPONDYLOSIS WITH RADICULOPATHY, LUMBAR REGION: Status: RESOLVED | Noted: 2020-09-09 | Resolved: 2024-08-04

## 2024-08-04 PROBLEM — Z86.79 HISTORY OF HYPERTENSION: Status: ACTIVE | Noted: 2024-08-04

## 2024-08-04 PROBLEM — K52.9 GASTROENTERITIS: Status: RESOLVED | Noted: 2023-11-01 | Resolved: 2024-08-04

## 2024-08-04 PROBLEM — F33.41 RECURRENT MAJOR DEPRESSIVE DISORDER, IN PARTIAL REMISSION (CMS-HCC): Status: ACTIVE | Noted: 2024-08-04

## 2024-08-04 PROBLEM — R07.9 CHEST PAIN: Status: RESOLVED | Noted: 2023-05-23 | Resolved: 2024-08-04

## 2024-08-04 PROBLEM — H53.8 BLURRED VISION: Status: RESOLVED | Noted: 2023-05-23 | Resolved: 2024-08-04

## 2024-08-04 PROBLEM — F32.A ANXIETY AND DEPRESSION: Status: RESOLVED | Noted: 2023-05-23 | Resolved: 2024-08-04

## 2024-08-04 PROBLEM — M75.122 NONTRAUMATIC COMPLETE TEAR OF LEFT ROTATOR CUFF: Status: RESOLVED | Noted: 2023-05-23 | Resolved: 2024-08-04

## 2024-08-04 PROBLEM — H65.91 RIGHT SEROUS OTITIS MEDIA: Status: RESOLVED | Noted: 2023-05-23 | Resolved: 2024-08-04

## 2024-08-04 PROBLEM — R13.10 DYSPHAGIA: Status: RESOLVED | Noted: 2023-05-23 | Resolved: 2024-08-04

## 2024-08-04 PROBLEM — H57.89 EYE SWELLING, LEFT: Status: RESOLVED | Noted: 2023-05-23 | Resolved: 2024-08-04

## 2024-08-04 PROBLEM — M25.622 ELBOW STIFFNESS, LEFT: Status: RESOLVED | Noted: 2024-02-13 | Resolved: 2024-08-04
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
## 2024-08-04 PROBLEM — M21.371 RIGHT FOOT DROP: Status: RESOLVED | Noted: 2020-09-09 | Resolved: 2024-08-04

## 2024-08-04 PROBLEM — E03.9 HYPOTHYROIDISM: Status: RESOLVED | Noted: 2023-05-23 | Resolved: 2024-08-04

## 2024-08-04 PROBLEM — M76.32 ILIOTIBIAL BAND SYNDROME OF LEFT SIDE: Status: RESOLVED | Noted: 2023-05-23 | Resolved: 2024-08-04

## 2024-08-04 PROBLEM — I10 HYPERTENSIVE DISORDER: Status: RESOLVED | Noted: 2023-11-01 | Resolved: 2024-08-04

## 2024-08-04 PROBLEM — M62.838 MUSCLE SPASM: Status: RESOLVED | Noted: 2023-05-23 | Resolved: 2024-08-04

## 2024-08-04 PROBLEM — Z91.81 HISTORY OF FALLING: Status: RESOLVED | Noted: 2020-09-09 | Resolved: 2024-08-04

## 2024-08-04 PROBLEM — T63.91XA ALLERGY OR TOXIC REACTION TO VENOM: Status: RESOLVED | Noted: 2023-05-23 | Resolved: 2024-08-04

## 2024-08-04 PROBLEM — D72.829 LEUKOCYTOSIS: Status: RESOLVED | Noted: 2023-11-01 | Resolved: 2024-08-04

## 2024-08-04 PROBLEM — G89.29 ABDOMINAL PAIN, CHRONIC, LEFT LOWER QUADRANT: Status: RESOLVED | Noted: 2023-05-23 | Resolved: 2024-08-04

## 2024-08-04 PROBLEM — R29.6 FALLS: Status: RESOLVED | Noted: 2023-05-23 | Resolved: 2024-08-04

## 2024-08-04 PROBLEM — M54.2 CERVICALGIA: Status: RESOLVED | Noted: 2020-09-09 | Resolved: 2024-08-04

## 2024-08-04 PROBLEM — R29.898 LEFT ARM WEAKNESS: Status: RESOLVED | Noted: 2023-05-23 | Resolved: 2024-08-04

## 2024-08-04 PROBLEM — M19.012 ARTHRITIS OF LEFT ACROMIOCLAVICULAR JOINT: Status: RESOLVED | Noted: 2023-05-23 | Resolved: 2024-08-04

## 2024-08-04 PROBLEM — M25.519 SHOULDER JOINT PAIN: Status: RESOLVED | Noted: 2023-11-01 | Resolved: 2024-08-04

## 2024-08-04 PROBLEM — G81.91 RIGHT HEMIPARESIS (MULTI): Status: RESOLVED | Noted: 2023-11-01 | Resolved: 2024-08-04

## 2024-08-04 PROBLEM — E55.9 VITAMIN D DEFICIENCY: Status: RESOLVED | Noted: 2023-05-23 | Resolved: 2024-08-04

## 2024-08-04 PROBLEM — M75.52 BURSITIS OF LEFT SHOULDER: Status: RESOLVED | Noted: 2023-05-23 | Resolved: 2024-08-04

## 2024-08-04 PROBLEM — H46.9 OPTIC NEURITIS: Status: RESOLVED | Noted: 2020-08-27 | Resolved: 2024-08-04

## 2024-08-04 PROBLEM — M75.40 IMPINGEMENT SYNDROME OF SHOULDER REGION: Status: ACTIVE | Noted: 2024-08-04

## 2024-08-04 PROBLEM — R73.9 HYPERGLYCEMIA: Status: RESOLVED | Noted: 2023-05-23 | Resolved: 2024-08-04

## 2024-08-04 PROBLEM — Z86.79 HISTORY OF HYPERTENSION: Status: RESOLVED | Noted: 2024-08-04 | Resolved: 2024-08-04

## 2024-08-04 PROBLEM — T75.3XXA MOTION SICKNESS: Status: RESOLVED | Noted: 2023-05-23 | Resolved: 2024-08-04

## 2024-08-04 PROBLEM — Z12.31 VISIT FOR SCREENING MAMMOGRAM: Status: RESOLVED | Noted: 2024-05-21 | Resolved: 2024-08-04

## 2024-08-04 PROBLEM — K61.1 PERIRECTAL ABSCESS: Status: RESOLVED | Noted: 2023-11-01 | Resolved: 2024-08-04

## 2024-08-04 PROBLEM — F41.9 ANXIETY AND DEPRESSION: Status: RESOLVED | Noted: 2023-05-23 | Resolved: 2024-08-04

## 2024-08-04 PROBLEM — M25.521 ELBOW PAIN, RIGHT: Status: RESOLVED | Noted: 2023-05-23 | Resolved: 2024-08-04

## 2024-08-04 LAB
AMPHETAMINES UR QL SCN: NORMAL
ANION GAP SERPL CALC-SCNC: 13 MMOL/L (ref 10–20)
APPEARANCE UR: CLEAR
B-HCG SERPL-ACNC: <2 MIU/ML
BARBITURATES UR QL SCN: NORMAL
BASOPHILS # BLD AUTO: 0.04 X10*3/UL (ref 0–0.1)
BASOPHILS NFR BLD AUTO: 0.4 %
BENZODIAZ UR QL SCN: NORMAL
BILIRUB UR STRIP.AUTO-MCNC: NEGATIVE MG/DL
BUN SERPL-MCNC: 7 MG/DL (ref 6–23)
BZE UR QL SCN: NORMAL
CALCIUM SERPL-MCNC: 9.5 MG/DL (ref 8.6–10.3)
CANNABINOIDS UR QL SCN: NORMAL
CARDIAC TROPONIN I PNL SERPL HS: 4 NG/L (ref 0–13)
CHLORIDE SERPL-SCNC: 103 MMOL/L (ref 98–107)
CO2 SERPL-SCNC: 24 MMOL/L (ref 21–32)
COLOR UR: YELLOW
CREAT SERPL-MCNC: 0.7 MG/DL (ref 0.5–1.05)
EGFRCR SERPLBLD CKD-EPI 2021: >90 ML/MIN/1.73M*2
EOSINOPHIL # BLD AUTO: 0.36 X10*3/UL (ref 0–0.7)
EOSINOPHIL NFR BLD AUTO: 3.9 %
ERYTHROCYTE [DISTWIDTH] IN BLOOD BY AUTOMATED COUNT: 12.1 % (ref 11.5–14.5)
FENTANYL+NORFENTANYL UR QL SCN: NORMAL
GLUCOSE BLD MANUAL STRIP-MCNC: 446 MG/DL (ref 74–99)
GLUCOSE BLD MANUAL STRIP-MCNC: 486 MG/DL (ref 74–99)
GLUCOSE SERPL-MCNC: 210 MG/DL (ref 74–99)
GLUCOSE UR STRIP.AUTO-MCNC: ABNORMAL MG/DL
HCT VFR BLD AUTO: 48.6 % (ref 36–46)
HGB BLD-MCNC: 16.6 G/DL (ref 12–16)
IMM GRANULOCYTES # BLD AUTO: 0.02 X10*3/UL (ref 0–0.7)
IMM GRANULOCYTES NFR BLD AUTO: 0.2 % (ref 0–0.9)
KETONES UR STRIP.AUTO-MCNC: ABNORMAL MG/DL
LEUKOCYTE ESTERASE UR QL STRIP.AUTO: NEGATIVE
LYMPHOCYTES # BLD AUTO: 2.27 X10*3/UL (ref 1.2–4.8)
LYMPHOCYTES NFR BLD AUTO: 24.9 %
MCH RBC QN AUTO: 30.2 PG (ref 26–34)
MCHC RBC AUTO-ENTMCNC: 34.2 G/DL (ref 32–36)
MCV RBC AUTO: 88 FL (ref 80–100)
METHADONE UR QL SCN: NORMAL
MONOCYTES # BLD AUTO: 0.48 X10*3/UL (ref 0.1–1)
MONOCYTES NFR BLD AUTO: 5.3 %
NEUTROPHILS # BLD AUTO: 5.95 X10*3/UL (ref 1.2–7.7)
NEUTROPHILS NFR BLD AUTO: 65.3 %
NITRITE UR QL STRIP.AUTO: NEGATIVE
NRBC BLD-RTO: 0 /100 WBCS (ref 0–0)
OPIATES UR QL SCN: NORMAL
OXYCODONE+OXYMORPHONE UR QL SCN: NORMAL
PCP UR QL SCN: NORMAL
PH UR STRIP.AUTO: 5.5 [PH]
PLATELET # BLD AUTO: 267 X10*3/UL (ref 150–450)
POTASSIUM SERPL-SCNC: 4.4 MMOL/L (ref 3.5–5.3)
PROT UR STRIP.AUTO-MCNC: NEGATIVE MG/DL
RBC # BLD AUTO: 5.5 X10*6/UL (ref 4–5.2)
RBC # UR STRIP.AUTO: NEGATIVE /UL
SODIUM SERPL-SCNC: 136 MMOL/L (ref 136–145)
SP GR UR STRIP.AUTO: 1.01
UROBILINOGEN UR STRIP.AUTO-MCNC: ABNORMAL MG/DL
WBC # BLD AUTO: 9.1 X10*3/UL (ref 4.4–11.3)

## 2024-08-04 PROCEDURE — 85025 COMPLETE CBC W/AUTO DIFF WBC: CPT

## 2024-08-04 PROCEDURE — 84484 ASSAY OF TROPONIN QUANT: CPT

## 2024-08-04 PROCEDURE — 2500000002 HC RX 250 W HCPCS SELF ADMINISTERED DRUGS (ALT 637 FOR MEDICARE OP, ALT 636 FOR OP/ED): Performed by: STUDENT IN AN ORGANIZED HEALTH CARE EDUCATION/TRAINING PROGRAM

## 2024-08-04 PROCEDURE — 96372 THER/PROPH/DIAG INJ SC/IM: CPT

## 2024-08-04 PROCEDURE — 81003 URINALYSIS AUTO W/O SCOPE: CPT | Mod: 59 | Performed by: STUDENT IN AN ORGANIZED HEALTH CARE EDUCATION/TRAINING PROGRAM

## 2024-08-04 PROCEDURE — 2500000004 HC RX 250 GENERAL PHARMACY W/ HCPCS (ALT 636 FOR OP/ED): Performed by: STUDENT IN AN ORGANIZED HEALTH CARE EDUCATION/TRAINING PROGRAM

## 2024-08-04 PROCEDURE — 99223 1ST HOSP IP/OBS HIGH 75: CPT

## 2024-08-04 PROCEDURE — 2550000001 HC RX 255 CONTRASTS: Performed by: INTERNAL MEDICINE

## 2024-08-04 PROCEDURE — 71045 X-RAY EXAM CHEST 1 VIEW: CPT

## 2024-08-04 PROCEDURE — 99285 EMERGENCY DEPT VISIT HI MDM: CPT | Mod: 25

## 2024-08-04 PROCEDURE — 2500000001 HC RX 250 WO HCPCS SELF ADMINISTERED DRUGS (ALT 637 FOR MEDICARE OP)

## 2024-08-04 PROCEDURE — 96375 TX/PRO/DX INJ NEW DRUG ADDON: CPT

## 2024-08-04 PROCEDURE — 70450 CT HEAD/BRAIN W/O DYE: CPT

## 2024-08-04 PROCEDURE — 2500000002 HC RX 250 W HCPCS SELF ADMINISTERED DRUGS (ALT 637 FOR MEDICARE OP, ALT 636 FOR OP/ED)

## 2024-08-04 PROCEDURE — 84702 CHORIONIC GONADOTROPIN TEST: CPT

## 2024-08-04 PROCEDURE — 80048 BASIC METABOLIC PNL TOTAL CA: CPT

## 2024-08-04 PROCEDURE — A9575 INJ GADOTERATE MEGLUMI 0.1ML: HCPCS | Performed by: INTERNAL MEDICINE

## 2024-08-04 PROCEDURE — 96365 THER/PROPH/DIAG IV INF INIT: CPT

## 2024-08-04 PROCEDURE — 70553 MRI BRAIN STEM W/O & W/DYE: CPT | Performed by: STUDENT IN AN ORGANIZED HEALTH CARE EDUCATION/TRAINING PROGRAM

## 2024-08-04 PROCEDURE — 70553 MRI BRAIN STEM W/O & W/DYE: CPT

## 2024-08-04 PROCEDURE — 70450 CT HEAD/BRAIN W/O DYE: CPT | Performed by: RADIOLOGY

## 2024-08-04 PROCEDURE — 82947 ASSAY GLUCOSE BLOOD QUANT: CPT

## 2024-08-04 PROCEDURE — 71045 X-RAY EXAM CHEST 1 VIEW: CPT | Performed by: RADIOLOGY

## 2024-08-04 PROCEDURE — 93005 ELECTROCARDIOGRAM TRACING: CPT

## 2024-08-04 PROCEDURE — 36415 COLL VENOUS BLD VENIPUNCTURE: CPT

## 2024-08-04 PROCEDURE — 80307 DRUG TEST PRSMV CHEM ANLYZR: CPT

## 2024-08-04 PROCEDURE — 2500000004 HC RX 250 GENERAL PHARMACY W/ HCPCS (ALT 636 FOR OP/ED)

## 2024-08-04 PROCEDURE — 2060000001 HC INTERMEDIATE ICU ROOM DAILY

## 2024-08-04 RX ORDER — TALC
3 POWDER (GRAM) TOPICAL NIGHTLY PRN
Status: DISCONTINUED | OUTPATIENT
Start: 2024-08-04 | End: 2024-08-05 | Stop reason: HOSPADM

## 2024-08-04 RX ORDER — GADOTERATE MEGLUMINE 376.9 MG/ML
0.2 INJECTION INTRAVENOUS
Status: COMPLETED | OUTPATIENT
Start: 2024-08-04 | End: 2024-08-04

## 2024-08-04 RX ORDER — ACETAMINOPHEN 325 MG/1
650 TABLET ORAL EVERY 4 HOURS PRN
Status: DISCONTINUED | OUTPATIENT
Start: 2024-08-04 | End: 2024-08-05 | Stop reason: HOSPADM

## 2024-08-04 RX ORDER — GUAIFENESIN 600 MG/1
600 TABLET, EXTENDED RELEASE ORAL EVERY 12 HOURS PRN
Status: DISCONTINUED | OUTPATIENT
Start: 2024-08-04 | End: 2024-08-05 | Stop reason: HOSPADM

## 2024-08-04 RX ORDER — DULOXETIN HYDROCHLORIDE 30 MG/1
60 CAPSULE, DELAYED RELEASE ORAL DAILY
Status: DISCONTINUED | OUTPATIENT
Start: 2024-08-04 | End: 2024-08-05 | Stop reason: HOSPADM

## 2024-08-04 RX ORDER — ALBUTEROL SULFATE 90 UG/1
2 INHALANT RESPIRATORY (INHALATION) EVERY 6 HOURS PRN
Status: DISCONTINUED | OUTPATIENT
Start: 2024-08-04 | End: 2024-08-05 | Stop reason: HOSPADM

## 2024-08-04 RX ORDER — DAPAGLIFLOZIN 5 MG/1
5 TABLET, FILM COATED ORAL
Status: DISCONTINUED | OUTPATIENT
Start: 2024-08-05 | End: 2024-08-05 | Stop reason: HOSPADM

## 2024-08-04 RX ORDER — BISACODYL 10 MG/1
10 SUPPOSITORY RECTAL DAILY PRN
Status: DISCONTINUED | OUTPATIENT
Start: 2024-08-04 | End: 2024-08-05 | Stop reason: HOSPADM

## 2024-08-04 RX ORDER — TRAZODONE HYDROCHLORIDE 50 MG/1
50 TABLET ORAL DAILY
Status: DISCONTINUED | OUTPATIENT
Start: 2024-08-04 | End: 2024-08-05 | Stop reason: HOSPADM

## 2024-08-04 RX ORDER — BISACODYL 5 MG
10 TABLET, DELAYED RELEASE (ENTERIC COATED) ORAL DAILY PRN
Status: DISCONTINUED | OUTPATIENT
Start: 2024-08-04 | End: 2024-08-05 | Stop reason: HOSPADM

## 2024-08-04 RX ORDER — INSULIN LISPRO 100 [IU]/ML
0-10 INJECTION, SOLUTION INTRAVENOUS; SUBCUTANEOUS
Status: DISCONTINUED | OUTPATIENT
Start: 2024-08-04 | End: 2024-08-04

## 2024-08-04 RX ORDER — ENOXAPARIN SODIUM 100 MG/ML
40 INJECTION SUBCUTANEOUS EVERY 24 HOURS
Status: DISCONTINUED | OUTPATIENT
Start: 2024-08-04 | End: 2024-08-05 | Stop reason: HOSPADM

## 2024-08-04 RX ORDER — DEXTROSE 50 % IN WATER (D50W) INTRAVENOUS SYRINGE
12.5
Status: DISCONTINUED | OUTPATIENT
Start: 2024-08-04 | End: 2024-08-04

## 2024-08-04 RX ORDER — LIDOCAINE 560 MG/1
1 PATCH PERCUTANEOUS; TOPICAL; TRANSDERMAL DAILY
Status: DISCONTINUED | OUTPATIENT
Start: 2024-08-05 | End: 2024-08-05 | Stop reason: HOSPADM

## 2024-08-04 RX ORDER — SUMATRIPTAN SUCCINATE 25 MG/1
50 TABLET ORAL ONCE AS NEEDED
Status: DISCONTINUED | OUTPATIENT
Start: 2024-08-04 | End: 2024-08-05 | Stop reason: HOSPADM

## 2024-08-04 RX ORDER — ONDANSETRON HYDROCHLORIDE 2 MG/ML
4 INJECTION, SOLUTION INTRAVENOUS ONCE
Status: COMPLETED | OUTPATIENT
Start: 2024-08-04 | End: 2024-08-04

## 2024-08-04 RX ORDER — POLYETHYLENE GLYCOL 3350 17 G/17G
17 POWDER, FOR SOLUTION ORAL DAILY
Status: DISCONTINUED | OUTPATIENT
Start: 2024-08-04 | End: 2024-08-05 | Stop reason: HOSPADM

## 2024-08-04 RX ORDER — TRAMADOL HYDROCHLORIDE 50 MG/1
50 TABLET ORAL EVERY 8 HOURS PRN
Status: DISCONTINUED | OUTPATIENT
Start: 2024-08-04 | End: 2024-08-05 | Stop reason: HOSPADM

## 2024-08-04 RX ORDER — ONDANSETRON HYDROCHLORIDE 2 MG/ML
4 INJECTION, SOLUTION INTRAVENOUS EVERY 6 HOURS PRN
Status: DISCONTINUED | OUTPATIENT
Start: 2024-08-04 | End: 2024-08-05 | Stop reason: HOSPADM

## 2024-08-04 RX ORDER — KETOROLAC TROMETHAMINE 30 MG/ML
15 INJECTION, SOLUTION INTRAMUSCULAR; INTRAVENOUS ONCE
Status: COMPLETED | OUTPATIENT
Start: 2024-08-04 | End: 2024-08-04

## 2024-08-04 RX ORDER — DEXTROSE 50 % IN WATER (D50W) INTRAVENOUS SYRINGE
25
Status: DISCONTINUED | OUTPATIENT
Start: 2024-08-04 | End: 2024-08-04

## 2024-08-04 RX ORDER — PANTOPRAZOLE SODIUM 40 MG/1
40 TABLET, DELAYED RELEASE ORAL
Status: DISCONTINUED | OUTPATIENT
Start: 2024-08-05 | End: 2024-08-05 | Stop reason: HOSPADM

## 2024-08-04 RX ORDER — PANTOPRAZOLE SODIUM 40 MG/10ML
40 INJECTION, POWDER, LYOPHILIZED, FOR SOLUTION INTRAVENOUS
Status: DISCONTINUED | OUTPATIENT
Start: 2024-08-05 | End: 2024-08-05 | Stop reason: HOSPADM

## 2024-08-04 RX ORDER — ATORVASTATIN CALCIUM 40 MG/1
40 TABLET, FILM COATED ORAL NIGHTLY
Status: DISCONTINUED | OUTPATIENT
Start: 2024-08-04 | End: 2024-08-05 | Stop reason: HOSPADM

## 2024-08-04 RX ORDER — INSULIN LISPRO 100 [IU]/ML
0-15 INJECTION, SOLUTION INTRAVENOUS; SUBCUTANEOUS
Status: DISCONTINUED | OUTPATIENT
Start: 2024-08-04 | End: 2024-08-05 | Stop reason: HOSPADM

## 2024-08-04 RX ORDER — DEXTROSE 50 % IN WATER (D50W) INTRAVENOUS SYRINGE
12.5
Status: DISCONTINUED | OUTPATIENT
Start: 2024-08-04 | End: 2024-08-05 | Stop reason: HOSPADM

## 2024-08-04 RX ORDER — INSULIN GLARGINE 100 [IU]/ML
5 INJECTION, SOLUTION SUBCUTANEOUS ONCE
Status: COMPLETED | OUTPATIENT
Start: 2024-08-04 | End: 2024-08-04

## 2024-08-04 RX ORDER — LEVOTHYROXINE SODIUM 50 UG/1
25 TABLET ORAL
Status: DISCONTINUED | OUTPATIENT
Start: 2024-08-05 | End: 2024-08-05 | Stop reason: HOSPADM

## 2024-08-04 RX ORDER — ASPIRIN 81 MG/1
81 TABLET ORAL DAILY
Status: DISCONTINUED | OUTPATIENT
Start: 2024-08-04 | End: 2024-08-05 | Stop reason: HOSPADM

## 2024-08-04 RX ORDER — DEXTROSE 50 % IN WATER (D50W) INTRAVENOUS SYRINGE
25
Status: DISCONTINUED | OUTPATIENT
Start: 2024-08-04 | End: 2024-08-05 | Stop reason: HOSPADM

## 2024-08-04 RX ADMIN — ONDANSETRON 4 MG: 2 INJECTION INTRAMUSCULAR; INTRAVENOUS at 13:23

## 2024-08-04 RX ADMIN — ASPIRIN 81 MG: 81 TABLET, COATED ORAL at 17:24

## 2024-08-04 RX ADMIN — GADOTERATE MEGLUMINE 20 ML: 376.9 INJECTION, SOLUTION INTRAVENOUS at 19:46

## 2024-08-04 RX ADMIN — INSULIN LISPRO 10 UNITS: 100 INJECTION, SOLUTION INTRAVENOUS; SUBCUTANEOUS at 18:48

## 2024-08-04 RX ADMIN — KETOROLAC TROMETHAMINE 15 MG: 30 INJECTION, SOLUTION INTRAMUSCULAR at 21:13

## 2024-08-04 RX ADMIN — DULOXETINE HYDROCHLORIDE 60 MG: 30 CAPSULE, DELAYED RELEASE ORAL at 17:24

## 2024-08-04 RX ADMIN — METHYLPREDNISOLONE SODIUM SUCCINATE 1000 MG: 1 INJECTION, POWDER, LYOPHILIZED, FOR SOLUTION INTRAMUSCULAR; INTRAVENOUS at 13:06

## 2024-08-04 RX ADMIN — ENOXAPARIN SODIUM 40 MG: 40 INJECTION SUBCUTANEOUS at 17:24

## 2024-08-04 RX ADMIN — INSULIN GLARGINE 5 UNITS: 100 INJECTION, SOLUTION SUBCUTANEOUS at 22:09

## 2024-08-04 RX ADMIN — INSULIN LISPRO 15 UNITS: 100 INJECTION, SOLUTION INTRAVENOUS; SUBCUTANEOUS at 21:26

## 2024-08-04 SDOH — SOCIAL STABILITY: SOCIAL INSECURITY: HAVE YOU HAD ANY THOUGHTS OF HARMING ANYONE ELSE?: NO

## 2024-08-04 SDOH — SOCIAL STABILITY: SOCIAL INSECURITY: HAS ANYONE EVER THREATENED TO HURT YOUR FAMILY OR YOUR PETS?: NO

## 2024-08-04 SDOH — SOCIAL STABILITY: SOCIAL INSECURITY: ABUSE: ADULT

## 2024-08-04 SDOH — SOCIAL STABILITY: SOCIAL INSECURITY: DOES ANYONE TRY TO KEEP YOU FROM HAVING/CONTACTING OTHER FRIENDS OR DOING THINGS OUTSIDE YOUR HOME?: NO

## 2024-08-04 SDOH — SOCIAL STABILITY: SOCIAL INSECURITY: DO YOU FEEL ANYONE HAS EXPLOITED OR TAKEN ADVANTAGE OF YOU FINANCIALLY OR OF YOUR PERSONAL PROPERTY?: NO

## 2024-08-04 SDOH — SOCIAL STABILITY: SOCIAL INSECURITY: ARE THERE ANY APPARENT SIGNS OF INJURIES/BEHAVIORS THAT COULD BE RELATED TO ABUSE/NEGLECT?: NO

## 2024-08-04 SDOH — SOCIAL STABILITY: SOCIAL INSECURITY: HAVE YOU HAD THOUGHTS OF HARMING ANYONE ELSE?: NO

## 2024-08-04 SDOH — SOCIAL STABILITY: SOCIAL INSECURITY: ARE YOU OR HAVE YOU BEEN THREATENED OR ABUSED PHYSICALLY, EMOTIONALLY, OR SEXUALLY BY ANYONE?: NO

## 2024-08-04 SDOH — SOCIAL STABILITY: SOCIAL INSECURITY: DO YOU FEEL UNSAFE GOING BACK TO THE PLACE WHERE YOU ARE LIVING?: NO

## 2024-08-04 SDOH — SOCIAL STABILITY: SOCIAL INSECURITY: POSSIBLE ABUSE REPORTED TO:: OTHER (COMMENT)

## 2024-08-04 ASSESSMENT — PAIN DESCRIPTION - PAIN TYPE: TYPE: ACUTE PAIN

## 2024-08-04 ASSESSMENT — ACTIVITIES OF DAILY LIVING (ADL)
WALKS IN HOME: NEEDS ASSISTANCE
JUDGMENT_ADEQUATE_SAFELY_COMPLETE_DAILY_ACTIVITIES: YES
DRESSING YOURSELF: INDEPENDENT
GROOMING: INDEPENDENT
PATIENT'S MEMORY ADEQUATE TO SAFELY COMPLETE DAILY ACTIVITIES?: YES
ADEQUATE_TO_COMPLETE_ADL: YES
FEEDING YOURSELF: INDEPENDENT
HEARING - RIGHT EAR: FUNCTIONAL
TOILETING: NEEDS ASSISTANCE
LACK_OF_TRANSPORTATION: NO
HEARING - LEFT EAR: FUNCTIONAL
BATHING: INDEPENDENT

## 2024-08-04 ASSESSMENT — COGNITIVE AND FUNCTIONAL STATUS - GENERAL
PATIENT BASELINE BEDBOUND: NO
CLIMB 3 TO 5 STEPS WITH RAILING: A LOT
HELP NEEDED FOR BATHING: A LITTLE
WALKING IN HOSPITAL ROOM: A LITTLE
DRESSING REGULAR UPPER BODY CLOTHING: A LITTLE
DRESSING REGULAR LOWER BODY CLOTHING: A LITTLE
DAILY ACTIVITIY SCORE: 20
STANDING UP FROM CHAIR USING ARMS: A LITTLE
TOILETING: A LITTLE
MOBILITY SCORE: 20

## 2024-08-04 ASSESSMENT — PATIENT HEALTH QUESTIONNAIRE - PHQ9
SUM OF ALL RESPONSES TO PHQ9 QUESTIONS 1 & 2: 0
2. FEELING DOWN, DEPRESSED OR HOPELESS: NOT AT ALL
1. LITTLE INTEREST OR PLEASURE IN DOING THINGS: NOT AT ALL

## 2024-08-04 ASSESSMENT — ENCOUNTER SYMPTOMS
WHEEZING: 0
VOMITING: 0
WEAKNESS: 0
HEMATURIA: 0
HEADACHES: 0
SHORTNESS OF BREATH: 0
CONSTIPATION: 0
NAUSEA: 0
CHEST TIGHTNESS: 0
CHILLS: 0
DIARRHEA: 0
FATIGUE: 0
FLANK PAIN: 0
JOINT SWELLING: 0
FEVER: 0
BACK PAIN: 1
COUGH: 0
PALPITATIONS: 0
TREMORS: 0
ABDOMINAL PAIN: 0
WOUND: 0

## 2024-08-04 ASSESSMENT — LIFESTYLE VARIABLES
HOW MANY STANDARD DRINKS CONTAINING ALCOHOL DO YOU HAVE ON A TYPICAL DAY: PATIENT DOES NOT DRINK
AUDIT-C TOTAL SCORE: 0
HOW OFTEN DO YOU HAVE A DRINK CONTAINING ALCOHOL: NEVER
PRESCIPTION_ABUSE_PAST_12_MONTHS: NO
SUBSTANCE_ABUSE_PAST_12_MONTHS: NO
HOW OFTEN DO YOU HAVE 6 OR MORE DRINKS ON ONE OCCASION: NEVER
AUDIT-C TOTAL SCORE: 0
SKIP TO QUESTIONS 9-10: 1

## 2024-08-04 ASSESSMENT — PAIN SCALES - GENERAL
PAINLEVEL_OUTOF10: 7
PAINLEVEL_OUTOF10: 0 - NO PAIN
PAINLEVEL_OUTOF10: 9
PAINLEVEL_OUTOF10: 4
PAINLEVEL_OUTOF10: 8
PAINLEVEL_OUTOF10: 2

## 2024-08-04 ASSESSMENT — PAIN - FUNCTIONAL ASSESSMENT: PAIN_FUNCTIONAL_ASSESSMENT: 0-10

## 2024-08-04 ASSESSMENT — PAIN DESCRIPTION - LOCATION: LOCATION: BACK

## 2024-08-04 NOTE — PROGRESS NOTES
Emergency Medicine Transition of Care Note.    I received Sara Uribe in signout from Dr. Arnett.  Please see the previous ED provider note for all HPI, PE and MDM up to the time of signout at 1600. This is in addition to the primary record.    In brief Sara Uribe is an 49 y.o. female presenting for   Chief Complaint   Patient presents with    MS flare up      At the time of signout we were awaiting: Neurology consult    ED Course as of 08/04/24 1642   Sun Aug 04, 2024   1230 ECG 12 lead  Sinus rhythm with artifact in diffuse leads no ST elevations [TWAN]   1246 CBC and Auto Differential(!)  No leukocytosis or anemia [TWAN]   1535 On reevaluation, patient resting more comfortably in bed and not having back spasms.  She still is having some stuttering of her speech but is making more coherent sentences. [TWAN]      ED Course User Index  [TWAN] Mary Arnett MD         Diagnoses as of 08/04/24 1642   Multiple sclerosis exacerbation (Multi)       Medical Decision Making    49-year-old female presented for symptoms concern of MS flare.  Workup including labs and CT brain and chest x-ray showed no acute abnormalities.  At the time of signout we are waiting to hear from neurology.  Spoke with neurologist on-call who recommended patient can stay at this hospital even though there is not can be a neurologist here for the next week.  Recommended getting MRI brain with and without contrast tomorrow.  Will place the order.  Also obtain UA.  Neurologist said that the hospitalist team is welcome to call them with any questions about patient.  Patient admitted  to stepdown unit for further management.  Final diagnoses:   [G35] Multiple sclerosis exacerbation (Multi)           Procedure  Procedures    Colt Irwin DO

## 2024-08-04 NOTE — H&P
[facility] - GENERAL MEDICINE HISTORY AND PHYSICAL    History Obtained From: Patient, mom    History Of Present Illness:  Sara Uribe is a 49 y.o. female with PMHx s/f MS relapsing-remitting, HTN, hypothyroidism, T2DM, anxiety and depression, bipolar disorder, fibromyalgia, peripheral neuropathy presenting with concerns of MS flare. She states over the past week, she has been dealing with right foot drop and back spasms. Her speech is worsening, with stutters and difficulty forming words and thoughts. Her back spasms are worsening, felt like sharp stabbing pain, with radiation of pain down to her bilateral legs. She feel paresthesias below her knees to her feet, with burning skin sensations all over her body. She has chronic left-sided vision loss, and the right sided vision with intermittent blurry and vision loss. Her mom reports that she doesn't have stuttering at baseline. She states the symptoms are similar to her past MS flare-ups.  Denies F/C/n/v, chest pain, shortness of breath, dizziness, hearing loss, abdominal pain, changes in urinary or bowel symptoms, syncope.    ED Course (Summary):   Vitals on presentation: 98.1 F, 103 bpm, 16 RR, 140/98, 99% on RA  Labs: CMP-glucose 210, sodium 136, potassium 4.4  Troponin 4  CBC unremarkable  Imaging: CXR-No evidence of acute cardiopulmonary process   CT head- No evidence of acute cortical infarct or intracranial hemorrhage. No evidence of intracranial hemorrhage or displaced skull fracture   Interventions: Solu-Medrol 1000 mg IV, Zofran 4 mg IV, admission for further management    ED Course (From Provider):  ED Course as of 08/04/24 1900   Sun Aug 04, 2024   1230 ECG 12 lead  Sinus rhythm with artifact in diffuse leads no ST elevations [TWAN]   1246 CBC and Auto Differential(!)  No leukocytosis or anemia [TWAN]   1535 On reevaluation, patient resting more comfortably in bed and not having back spasms.  She still is having some stuttering of her  speech but is making more coherent sentences. [TWAN]      ED Course User Index  [TWAN] Mary Arnett MD         Diagnoses as of 08/04/24 1900   Multiple sclerosis exacerbation (Multi)     Relevant Results  Results for orders placed or performed during the hospital encounter of 08/04/24 (from the past 24 hour(s))   CBC and Auto Differential   Result Value Ref Range    WBC 9.1 4.4 - 11.3 x10*3/uL    nRBC 0.0 0.0 - 0.0 /100 WBCs    RBC 5.50 (H) 4.00 - 5.20 x10*6/uL    Hemoglobin 16.6 (H) 12.0 - 16.0 g/dL    Hematocrit 48.6 (H) 36.0 - 46.0 %    MCV 88 80 - 100 fL    MCH 30.2 26.0 - 34.0 pg    MCHC 34.2 32.0 - 36.0 g/dL    RDW 12.1 11.5 - 14.5 %    Platelets 267 150 - 450 x10*3/uL    Neutrophils % 65.3 40.0 - 80.0 %    Immature Granulocytes %, Automated 0.2 0.0 - 0.9 %    Lymphocytes % 24.9 13.0 - 44.0 %    Monocytes % 5.3 2.0 - 10.0 %    Eosinophils % 3.9 0.0 - 6.0 %    Basophils % 0.4 0.0 - 2.0 %    Neutrophils Absolute 5.95 1.20 - 7.70 x10*3/uL    Immature Granulocytes Absolute, Automated 0.02 0.00 - 0.70 x10*3/uL    Lymphocytes Absolute 2.27 1.20 - 4.80 x10*3/uL    Monocytes Absolute 0.48 0.10 - 1.00 x10*3/uL    Eosinophils Absolute 0.36 0.00 - 0.70 x10*3/uL    Basophils Absolute 0.04 0.00 - 0.10 x10*3/uL   Basic metabolic panel   Result Value Ref Range    Glucose 210 (H) 74 - 99 mg/dL    Sodium 136 136 - 145 mmol/L    Potassium 4.4 3.5 - 5.3 mmol/L    Chloride 103 98 - 107 mmol/L    Bicarbonate 24 21 - 32 mmol/L    Anion Gap 13 10 - 20 mmol/L    Urea Nitrogen 7 6 - 23 mg/dL    Creatinine 0.70 0.50 - 1.05 mg/dL    eGFR >90 >60 mL/min/1.73m*2    Calcium 9.5 8.6 - 10.3 mg/dL   Troponin I, High Sensitivity   Result Value Ref Range    Troponin I, High Sensitivity 4 0 - 13 ng/L   hCG, quantitative, pregnancy   Result Value Ref Range    HCG, Beta-Quantitative <2 <5 mIU/mL   POCT GLUCOSE   Result Value Ref Range    POCT Glucose 446 (H) 74 - 99 mg/dL      CT head wo IV contrast    Result Date: 8/4/2024  Interpreted By:   Michael Blank, STUDY: CT HEAD WO IV CONTRAST;  8/4/2024 1:38 pm   INDICATION: Signs/Symptoms:suttering, right leg weakness, possible MS flare.   COMPARISON: 05/06/2023   ACCESSION NUMBER(S): [id number]   ORDERING CLINICIAN: SVETLANA RUSSO   TECHNIQUE: Noncontrast axial CT scan of head was performed. Angled reformats in brain and bone windows were generated. The images were reviewed in bone, brain, blood and soft tissue windows.   FINDINGS: CSF Spaces: The ventricles, sulci and basal cisterns are within normal limits. There is no extraaxial fluid collection.   Parenchyma:  The grey-white differentiation is intact. There is no mass effect or midline shift.  There is no intracranial hemorrhage.   Calvarium: The calvarium is unremarkable.   Paranasal sinuses and mastoids: Visualized paranasal sinuses and mastoids are clear. Nasal septal deviation to the right.       No evidence of acute cortical infarct or intracranial hemorrhage.   No evidence of intracranial hemorrhage or displaced skull fracture.   MACRO: None   Signed by: Michael Blank 8/4/2024 1:52 PM Dictation workstation:   [id number]    XR chest 1 view    Result Date: 8/4/2024  Interpreted By:  Michael Blank, STUDY: XR CHEST 1 VIEW;  8/4/2024 1:02 pm   INDICATION: Signs/Symptoms:chest pain.   COMPARISON: 09/18/2023   ACCESSION NUMBER(S): [id number]   ORDERING CLINICIAN: SVETLANA RUSSO   FINDINGS:         CARDIOMEDIASTINAL SILHOUETTE: Cardiomediastinal silhouette is normal in size and configuration.   LUNGS: Lungs are clear.   ABDOMEN: No remarkable upper abdominal findings.   BONES: No acute osseous changes.       1.  No evidence of acute cardiopulmonary process.       MACRO: None   Signed by: Michael Blank 8/4/2024 1:03 PM Dictation workstation:   [id number]    Scheduled medications:  aspirin, 81 mg, oral, Daily  [Held by provider] atorvastatin, 40 mg, oral, Nightly  [Held by provider] dapagliflozin propanediol, 5 mg, oral, Daily before  breakfast  DULoxetine, 60 mg, oral, Daily  enoxaparin, 40 mg, subcutaneous, q24h  gadoterate meglumine, 0.2 mL/kg, intravenous, Once in imaging  insulin lispro, 0-10 Units, subcutaneous, Before meals & nightly  [Held by provider] lamoTRIgine, 150 mg, oral, Nightly  [Held by provider] levothyroxine, 25 mcg, oral, Daily before breakfast  [START ON 8/5/2024] pantoprazole, 40 mg, oral, Daily before breakfast   Or  [START ON 8/5/2024] pantoprazole, 40 mg, intravenous, Daily before breakfast  polyethylene glycol, 17 g, oral, Daily  [Held by provider] traZODone, 50 mg, oral, Daily      Continuous medications:     PRN medications:  PRN medications: acetaminophen, albuterol, bisacodyl, bisacodyl, dextrose, dextrose, glucagon, glucagon, guaiFENesin, melatonin, ondansetron, SUMAtriptan     Past Medical History  She has a past medical history of Anxiety, Asthma ([facility]), Bipolar disorder (Multi), Candidal esophagitis (Multi) (02/11/2020), Delayed emergence from general anesthesia, Diabetes mellitus (Multi), GERD (gastroesophageal reflux disease), Hyperlipidemia, Hypertension, Personal history of other diseases of the circulatory system, Personal history of other diseases of the nervous system and sense organs (04/20/2022), Personal history of other diseases of the nervous system and sense organs (12/02/2022), Personal history of other diseases of the nervous system and sense organs, Personal history of other diseases of the respiratory system, Personal history of other infectious and parasitic diseases (08/24/2018), and Sleep apnea.    Surgical History  She has a past surgical history that includes Hysteroscopy (02/02/2016); Cholecystectomy (02/02/2016); Other surgical history (05/28/2020); Esophagogastroduodenoscopy (11/30/2017); and Other surgical history (10/30/2019).     Social History  She reports that she has been smoking cigarettes. She has never used smokeless tobacco. She reports that she does not currently use  alcohol. She reports that she does not use drugs.    Family History  Family History   Problem Relation Name Age of Onset    Rheum arthritis Mother      Alcohol abuse Father      Coronary artery disease Father      Lung cancer Maternal Grandmother      Lung cancer Maternal Grandfather      Lung cancer Paternal Grandmother      Colon cancer Paternal Grandfather      Breast cancer Other AUNT     Multiple sclerosis Other AUNT     Colon cancer Other UNCLE     Breast cancer Other COUSIN        Allergies  Bee venom protein (honey bee), Latex, and Dimethyl fumarate    Code Status  Full Code     Review of Systems   Constitutional:  Negative for chills, fatigue and fever.   HENT:  Negative for congestion and hearing loss.    Eyes:  Positive for visual disturbance.   Respiratory:  Negative for cough, chest tightness, shortness of breath and wheezing.    Cardiovascular:  Negative for chest pain, palpitations and leg swelling.   Gastrointestinal:  Negative for abdominal pain, constipation, diarrhea, nausea and vomiting.   Genitourinary:  Negative for flank pain and hematuria.   Musculoskeletal:  Positive for back pain. Negative for joint swelling.   Skin:  Negative for rash and wound.   Neurological:  Negative for tremors, syncope, weakness and headaches.        Paresthesia       Last Recorded Vitals  /82 (BP Location: Left arm, Patient Position: Lying)   Pulse 99   Temp 36.2 °C (97.2 °F) (Temporal)   Resp 19   Wt 99.8 kg (220 lb)   SpO2 97%      Physical Exam:  Vital signs and nursing notes reviewed.   Constitutional: Pleasant and cooperative. Word stuttering. Sitting up in bed in no acute distress.   Skin: Warm and dry; no obvious lesions, rashes, pallor, or jaundice. Good turgor.   Eyes: EOMI. Anicteric sclera. Make eye contacts.  ENT: Mucous membranes moist; no obvious injury or deformity appreciated.   Head and Neck: Normocephalic, atraumatic. ROM preserved. Trachea midline.   Respiratory: Nonlabored on RA. Lungs  clear to auscultation bilaterally without obvious adventitious sounds. Chest rise is equal.  Cardiovascular: RRR. No gross murmur, gallop, or rub. Extremities are warm and well-perfused with good capillary refill (< 3 seconds). No chest wall tenderness.   GI: Abdomen soft, nontender, nondistended. No obvious organomegaly appreciated. Bowel sounds are present and normoactive.  : No CVA tenderness.   MSK: No gross abnormalities appreciated. Tenderness to right lateral lumbar area. Right foot drop.   Extremities: No cyanosis, edema, or clubbing evident. Neurovascularly intact.   Neuro: A&Ox3. Able to respond to questions appropriately and clearly. Decreased sensation in left neck. 5/5 strength left foot. 4/5 strength in right hip flexion.  Psych: Appropriate mood and behavior.    Assessment/Plan   Principal Problem:    Multiple sclerosis exacerbation (Multi)    Questionable multiple sclerosis exacerbation vs. Alternative process  -IV Solu-Medrol 1 g given in ED; continue with daily IV solumedrol until further imaging studies comes back  -MRI brain w wo contrast  -neuro consult ordered; ED was notified that neuro coverage will not be available for a week starting Mon, and the ED provider consulted a neurologist, who recommended that patient can stay at this hospital    DM-II with hyperglycemia  -Hold home oral meds for now   -Continue with SSI ACHS   -Accucheks, hypoglycemic protocol   -Monitor and adjust as needed     Bipolar disorder  -Continue on Cymbalta  -Rexulti is not available in formulary substitution    HTN, hypothyroidism, anxiety and depression  -reorder home meds once med reconciliation is completed    Home meds under Orders are held for now as pharmacy has no recent refill history on them  Med reconciliation is not completed, readjust dose and medications as necessary    Diet: Regular  DVT Prophylaxis: Lovenox SQ  Code Status: Full code     Mil Godinez PA-C    Dragon dictation software was used to  dictate this note and thus there may be minor errors in translation/transcription including garbled speech or misspellings. Please contact for clarification if needed.

## 2024-08-04 NOTE — ED PROVIDER NOTES
HPI   Chief Complaint   Patient presents with    MS flare up        HPI  Patient is a 49-year-old female presents emergency department for concern for MS flare.  States over the past week has been having back spasms and dropfoot consistent with her MS flares.  States her back spasms include her entire back.  States this morning she began having difficulty talking with stuttering.  Endorses a frontal headache that is not worse like she has ever had.  Some nausea and cough.  She is coming by her mom assist with giving history.  Her last MS failure requiring hospitalization was 2 to 3 years ago.  Denies fevers, chills, vomiting, diarrhea, chest pain, difficulty urinating, loss control bowel or bladder movements, saddle anesthesia    Patient History   Past Medical History:   Diagnosis Date    Anxiety     Asthma ([facility])     Bipolar disorder (Multi)     Candidal esophagitis (Multi) 02/11/2020    Esophageal thrush    Delayed emergence from general anesthesia     with cholecystectomy    Diabetes mellitus (Multi)     GERD (gastroesophageal reflux disease)     Hyperlipidemia     Hypertension     Personal history of other diseases of the circulatory system     History of hypertension    Personal history of other diseases of the nervous system and sense organs 04/20/2022    History of optic neuritis    Personal history of other diseases of the nervous system and sense organs 12/02/2022    History of blindness    Personal history of other diseases of the nervous system and sense organs     History of sleep apnea    Personal history of other diseases of the respiratory system     History of acute bronchitis    Personal history of other infectious and parasitic diseases 08/24/2018    History of candidiasis of mouth    Sleep apnea      Past Surgical History:   Procedure Laterality Date    CHOLECYSTECTOMY  02/02/2016    Cholecystectomy    ESOPHAGOGASTRODUODENOSCOPY  11/30/2017    Diagnostic Esophagogastroduodenoscopy     HYSTEROSCOPY  02/02/2016    Hysteroscopy With Endometrial Ablation    OTHER SURGICAL HISTORY  05/28/2020    Esophagogastroduodenoscopy    OTHER SURGICAL HISTORY  10/30/2019    Cyst excision     Family History   Problem Relation Name Age of Onset    Rheum arthritis Mother      Alcohol abuse Father      Coronary artery disease Father      Lung cancer Maternal Grandmother      Lung cancer Maternal Grandfather      Lung cancer Paternal Grandmother      Colon cancer Paternal Grandfather      Breast cancer Other AUNT     Multiple sclerosis Other AUNT     Colon cancer Other UNCLE     Breast cancer Other COUSIN      Social History     Tobacco Use    Smoking status: Every Day     Current packs/day: 0.50     Types: Cigarettes    Smokeless tobacco: Never   Vaping Use    Vaping status: Never Used   Substance Use Topics    Alcohol use: Not Currently    Drug use: Never       Physical Exam   ED Triage Vitals   Temperature Heart Rate Respirations BP   08/04/24 1205 08/04/24 1205 08/04/24 1205 08/04/24 1205   36.7 °C (98.1 °F) (!) 103 16 (!) 148/98      Pulse Ox Temp src Heart Rate Source Patient Position   08/04/24 1205 -- 08/04/24 1221 08/04/24 1221   99 %  Monitor Sitting      BP Location FiO2 (%)     08/04/24 1221 08/04/24 1205     Left arm 21 %       Physical Exam  General: Well-developed, well-nourished patient lying in bed who appears non-toxic.  Head: Atraumatic, normocephalic.  Eyes: Sclera anicteric.  Extraocular movements intact pupils equal round reactive light.  ENT: Mucous membranes moist.  Heart: Regular rate and rhythm.  Equal bladder radial pulses.  Lungs: Clear to auscultation bilaterally.  Normal respiratory pattern without conversational dyspnea or respiratory distress.  Abdomen: Soft, non-tender, non-distended, no guarding or peritoneal signs.  No flank tenderness  Neurologic: Alert and oriented x 4.  Stuttering.  Tremulous.  No sensory changes.  4/5 flexion right hip.  5/5 flexion left hip.  Psychiatric: Mood  "and affect appropriate.  Skin: Warm and dry, no appreciable rash.  Musculoskeletal: No peripheral edema.  Bilateral paraspinal thoracic and lumbar tenderness.      ED Course & MDM   ED Course as of 08/04/24 1627   Sun Aug 04, 2024   1230 ECG 12 lead  Sinus rhythm with artifact in diffuse leads no ST elevations [TWAN]   1246 CBC and Auto Differential(!)  No leukocytosis or anemia [TWAN]   1535 On reevaluation, patient resting more comfortably in bed and not having back spasms.  She still is having some stuttering of her speech but is making more coherent sentences. [TWAN]      ED Course User Index  [TWAN] Mary Arnett MD         Diagnoses as of 08/04/24 1627   Multiple sclerosis exacerbation (Multi)                       Amol Coma Scale Score: 15                        Medical Decision Making  Patient is a 49-year-old female who presents emergency department for concern for MS flare.  On evaluation, patient having full backs spasms with worsening right leg weakness.  Patient with no midline spinal tenderness.  Sensation intact bilateral lower extremities and patient denies urinary symptoms.  Lower suspicion for cord compression, epidural hematoma.  Patient is having difficulty with her speech and but is not consistent with dysarthria or aphasia.  It is still possible patient may have had a stroke but her last known well was over a week ago. NIH 2 for right leg weakness other differentials include MS flare, anxiety.  Solu-Medrol 1 g IV ordered.    Neurology telemedicine note from 11/8/2023 with Dr. Falcon reviewed.  Patient was having \"attacks\" of her multiple sclerosis that neurology does not believe are true MS exacerbations and could be due to non-MS causes.    ED Course as of 08/04/24 1641   Sun Aug 04, 2024   1230 ECG 12 lead  Sinus rhythm with artifact in diffuse leads no ST elevations [TWAN]   1246 CBC and Auto Differential(!)  No leukocytosis or anemia [TWAN]   1535 On reevaluation, patient resting more comfortably " in bed and not having back spasms.  She still is having some stuttering of her speech but is making more coherent sentences. [TWAN]      ED Course User Index  [TWAN] Mary Arnett MD         Diagnoses as of 08/04/24 1641   Multiple sclerosis exacerbation (Multi)     CT head with no acute changes.  Patient is about home with a roommate in a mobile home.  Due to these new changes especially difficulty walking with her right leg weakness, she does not feel safe going home.  She would benefit from admission to the hospital for possible MS flare with IV steroids and neuro evaluation.  Patient is agreeable to this plan.    Procedure  Procedures     Mary Arnett MD  08/04/24 3671

## 2024-08-04 NOTE — ED NOTES
Pt arrives to ED via car from home with c/o exacerbation of MS and pain all over.    Code Status:  No Order    HPI     Chief Complaint   Patient presents with    MS flare up        /81 (BP Location: Left arm, Patient Position: Sitting)   Pulse 97   Temp 36.7 °C (98.1 °F)   Resp 20   Wt 99.8 kg (220 lb)   SpO2 98%     B[place]ly Coma Scale Score: 15      LDA:   Peripheral IV 08/04/24 20 G Distal;Right;Upper;Anterior Arm (Active)   Placement Date/Time: 08/04/24 1220   Hand Hygiene Completed: Yes  Size (Gauge): 20 G  Orientation: Distal;Right;Upper;Anterior  Location: Arm  Site Prep: Chlorhexidine    Number of days: 0        BACKGROUND  Past Medical History:   Diagnosis Date    Anxiety     Asthma ([facility])     Bipolar disorder (Multi)     Candidal esophagitis (Multi) 02/11/2020    Esophageal thrush    Delayed emergence from general anesthesia     with cholecystectomy    Diabetes mellitus (Multi)     GERD (gastroesophageal reflux disease)     Hyperlipidemia     Hypertension     Personal history of other diseases of the circulatory system     History of hypertension    Personal history of other diseases of the nervous system and sense organs 04/20/2022    History of optic neuritis    Personal history of other diseases of the nervous system and sense organs 12/02/2022    History of blindness    Personal history of other diseases of the nervous system and sense organs     History of sleep apnea    Personal history of other diseases of the respiratory system     History of acute bronchitis    Personal history of other infectious and parasitic diseases 08/24/2018    History of candidiasis of mouth    Sleep apnea      Past Surgical History:   Procedure Laterality Date    CHOLECYSTECTOMY  02/02/2016    Cholecystectomy    ESOPHAGOGASTRODUODENOSCOPY  11/30/2017    Diagnostic Esophagogastroduodenoscopy    HYSTEROSCOPY  02/02/2016    Hysteroscopy With Endometrial Ablation    OTHER SURGICAL HISTORY  05/28/2020     Esophagogastroduodenoscopy    OTHER SURGICAL HISTORY  10/30/2019    Cyst excision     No current facility-administered medications on file prior to encounter.     Current Outpatient Medications on File Prior to Encounter   Medication Sig Dispense Refill    albuterol 90 mcg/actuation inhaler INHALE 2 PUFFS BY MOUTH EVERY 6 HOURS AS NEEDED 8.5 g 0    aspirin 81 mg EC tablet Take by mouth once daily.      atorvastatin (Lipitor) 40 mg tablet Take 1 tablet (40 mg) by mouth once daily at bedtime.      baclofen (Lioresal) 10 mg tablet Take 1-2 tablets (10-20 mg) by mouth 3 times a day. For hand tremors 270 tablet 3    blood sugar diagnostic (True Metrix Glucose Test Strip) strip CHECK BLOOD GLUCOSE once daily      brexpiprazole (Rexulti) 1 mg tablet Take 1 tablet (1 mg) by mouth once daily. 30 tablet 11    dapagliflozin (Farxiga) 5 mg Take 1 tablet (5 mg) by mouth once daily in the morning. Take before meals.      dexlansoprazole (Dexilant) 60 mg DR capsule Take 1 capsule (60 mg) by mouth once daily.      DULoxetine (Cymbalta) 30 mg DR capsule Take 1 capsule (30 mg) by mouth once daily. To take with 60 mg to total 90 mg once daily. Do not crush or chew. 90 capsule 3    DULoxetine (Cymbalta) 60 mg DR capsule Take 1 capsule (60 mg) by mouth once daily. 90 capsule 3    lamoTRIgine (LaMICtal) 150 mg tablet Take 1 tablet (150 mg) by mouth once daily at bedtime.      levothyroxine (LevoxyL) 25 mcg tablet Take by mouth once daily in the morning. Take before meals.      melatonin 5 mg tablet Take 1 tablet (5 mg) by mouth as needed at bedtime.      meloxicam (Mobic) 15 mg tablet Take 1 tablet (15 mg) by mouth once daily.      metFORMIN XR (Glucophage-XR) 750 mg 24 hr tablet Take 1 tablet (750 mg) by mouth 2 times a day. Do not crush, chew, or split.      SUMAtriptan (Imitrex) 50 mg tablet Take 1 tablet (50 mg) by mouth 1 time if needed for migraine (MAY REPEAT DOSE IN 2 HOURS.  MAX 2 TABS IN 24 HOURS). 9 tablet 2    teriflunomide  (AUBAGIO) 14 mg tablet tablet TAKE 1 TABLET BY MOUTH 1 TIME A DAY 30 tablet 1    traZODone (Desyrel) 50 mg tablet Take 1 tablet (50 mg) by mouth once daily.      [DISCONTINUED] DULoxetine (Cymbalta) 30 mg DR capsule Take 1 capsule (30 mg) by mouth once daily. To take with 60 mg to total 90 mg once daily. Do not crush or chew. 30 capsule 11    [DISCONTINUED] DULoxetine (Cymbalta) 60 mg DR capsule Take 1 capsule (60 mg) by mouth once daily. 30 capsule 11        ASSESSMENT  ED Course as of 08/04/24 1626   Sun Aug 04, 2024   1230 ECG 12 lead  Sinus rhythm with artifact in diffuse leads no ST elevations [TWAN]   1246 CBC and Auto Differential(!)  No leukocytosis or anemia [TWAN]   1535 On reevaluation, patient resting more comfortably in bed and not having back spasms.  She still is having some stuttering of her speech but is making more coherent sentences. [TWAN]      ED Course User Index  [TWAN] Mary Arnett MD         Diagnoses as of 08/04/24 1626   Multiple sclerosis exacerbation (Multi)       Medications Currently Running:       Medications Given:  ED Medication Administration from 08/04/2024 1149 to 08/04/2024 1626         Date/Time Order Dose Route Action Action by     08/04/2024 1306 EDT methylPREDNISolone sodium succinate (SOLU-Medrol) 1,000 mg in dextrose 5% 100 mL IV 1,000 mg intravenous New Bag MELLY Dudley     08/04/2024 1323 EDT ondansetron (Zofran) injection 4 mg 4 mg intravenous Given MELLY Dudley     08/04/2024 1406 EDT methylPREDNISolone sodium succinate (SOLU-Medrol) 1,000 mg in dextrose 5% 100 mL IV 0 mg intravenous Stopped MELLY Dudley                 RESULTS    Imaging:  CT head wo IV contrast   Final Result   No evidence of acute cortical infarct or intracranial hemorrhage.        No evidence of intracranial hemorrhage or displaced skull fracture.        MACRO:   None        Signed by: Michael Blank 8/4/2024 1:52 PM   Dictation workstation:   [id number]      XR chest 1 view   Final Result   1.  No  evidence of acute cardiopulmonary process.                  MACRO:   None        Signed by: Michael Blank 8/4/2024 1:03 PM   Dictation workstation:   [id number]         }  Labs ::99  Abnormal Labs Reviewed   CBC WITH AUTO DIFFERENTIAL - Abnormal; Notable for the following components:       Result Value    RBC 5.50 (*)     Hemoglobin 16.6 (*)     Hematocrit 48.6 (*)     All other components within normal limits   BASIC METABOLIC PANEL - Abnormal; Notable for the following components:    Glucose 210 (*)     All other components within normal limits                 Lyric Dudley RN  08/04/24 3847

## 2024-08-05 ENCOUNTER — DOCUMENTATION (OUTPATIENT)
Dept: HOME HEALTH SERVICES | Facility: HOME HEALTH | Age: 49
End: 2024-08-05
Payer: COMMERCIAL

## 2024-08-05 ENCOUNTER — HOME HEALTH ADMISSION (OUTPATIENT)
Dept: HOME HEALTH SERVICES | Facility: HOME HEALTH | Age: 49
End: 2024-08-05
Payer: COMMERCIAL

## 2024-08-05 VITALS
OXYGEN SATURATION: 93 % | SYSTOLIC BLOOD PRESSURE: 153 MMHG | BODY MASS INDEX: 32 KG/M2 | RESPIRATION RATE: 18 BRPM | WEIGHT: 216.05 LBS | DIASTOLIC BLOOD PRESSURE: 89 MMHG | HEART RATE: 105 BPM | HEIGHT: 69 IN | TEMPERATURE: 97.8 F

## 2024-08-05 LAB
ALBUMIN SERPL BCP-MCNC: 3.9 G/DL (ref 3.4–5)
ALP SERPL-CCNC: 136 U/L (ref 33–110)
ALT SERPL W P-5'-P-CCNC: 35 U/L (ref 7–45)
ANION GAP SERPL CALC-SCNC: 13 MMOL/L (ref 10–20)
AST SERPL W P-5'-P-CCNC: 16 U/L (ref 9–39)
BASOPHILS # BLD AUTO: 0.01 X10*3/UL (ref 0–0.1)
BASOPHILS NFR BLD AUTO: 0.1 %
BILIRUB SERPL-MCNC: 0.3 MG/DL (ref 0–1.2)
BUN SERPL-MCNC: 13 MG/DL (ref 6–23)
CALCIUM SERPL-MCNC: 9.3 MG/DL (ref 8.6–10.3)
CHLORIDE SERPL-SCNC: 101 MMOL/L (ref 98–107)
CO2 SERPL-SCNC: 21 MMOL/L (ref 21–32)
CREAT SERPL-MCNC: 0.78 MG/DL (ref 0.5–1.05)
EGFRCR SERPLBLD CKD-EPI 2021: >90 ML/MIN/1.73M*2
EOSINOPHIL # BLD AUTO: 0 X10*3/UL (ref 0–0.7)
EOSINOPHIL NFR BLD AUTO: 0 %
ERYTHROCYTE [DISTWIDTH] IN BLOOD BY AUTOMATED COUNT: 12.2 % (ref 11.5–14.5)
EST. AVERAGE GLUCOSE BLD GHB EST-MCNC: 217 MG/DL
GLUCOSE BLD MANUAL STRIP-MCNC: 353 MG/DL (ref 74–99)
GLUCOSE SERPL-MCNC: 376 MG/DL (ref 74–99)
HBA1C MFR BLD: 9.2 %
HCT VFR BLD AUTO: 45.3 % (ref 36–46)
HGB BLD-MCNC: 15.4 G/DL (ref 12–16)
HOLD SPECIMEN: NORMAL
IMM GRANULOCYTES # BLD AUTO: 0.05 X10*3/UL (ref 0–0.7)
IMM GRANULOCYTES NFR BLD AUTO: 0.3 % (ref 0–0.9)
LYMPHOCYTES # BLD AUTO: 0.94 X10*3/UL (ref 1.2–4.8)
LYMPHOCYTES NFR BLD AUTO: 6.6 %
MAGNESIUM SERPL-MCNC: 1.94 MG/DL (ref 1.6–2.4)
MCH RBC QN AUTO: 30 PG (ref 26–34)
MCHC RBC AUTO-ENTMCNC: 34 G/DL (ref 32–36)
MCV RBC AUTO: 88 FL (ref 80–100)
MONOCYTES # BLD AUTO: 0.11 X10*3/UL (ref 0.1–1)
MONOCYTES NFR BLD AUTO: 0.8 %
NEUTROPHILS # BLD AUTO: 13.21 X10*3/UL (ref 1.2–7.7)
NEUTROPHILS NFR BLD AUTO: 92.2 %
NRBC BLD-RTO: 0 /100 WBCS (ref 0–0)
PLATELET # BLD AUTO: 259 X10*3/UL (ref 150–450)
POTASSIUM SERPL-SCNC: 4.3 MMOL/L (ref 3.5–5.3)
PROT SERPL-MCNC: 7.3 G/DL (ref 6.4–8.2)
RBC # BLD AUTO: 5.14 X10*6/UL (ref 4–5.2)
SODIUM SERPL-SCNC: 131 MMOL/L (ref 136–145)
WBC # BLD AUTO: 14.3 X10*3/UL (ref 4.4–11.3)

## 2024-08-05 PROCEDURE — 2500000005 HC RX 250 GENERAL PHARMACY W/O HCPCS: Performed by: STUDENT IN AN ORGANIZED HEALTH CARE EDUCATION/TRAINING PROGRAM

## 2024-08-05 PROCEDURE — 2500000004 HC RX 250 GENERAL PHARMACY W/ HCPCS (ALT 636 FOR OP/ED)

## 2024-08-05 PROCEDURE — 99239 HOSP IP/OBS DSCHRG MGMT >30: CPT | Performed by: INTERNAL MEDICINE

## 2024-08-05 PROCEDURE — 80053 COMPREHEN METABOLIC PANEL: CPT

## 2024-08-05 PROCEDURE — 83036 HEMOGLOBIN GLYCOSYLATED A1C: CPT

## 2024-08-05 PROCEDURE — 36415 COLL VENOUS BLD VENIPUNCTURE: CPT

## 2024-08-05 PROCEDURE — 2500000001 HC RX 250 WO HCPCS SELF ADMINISTERED DRUGS (ALT 637 FOR MEDICARE OP): Performed by: INTERNAL MEDICINE

## 2024-08-05 PROCEDURE — 82947 ASSAY GLUCOSE BLOOD QUANT: CPT | Mod: 59

## 2024-08-05 PROCEDURE — 2500000002 HC RX 250 W HCPCS SELF ADMINISTERED DRUGS (ALT 637 FOR MEDICARE OP, ALT 636 FOR OP/ED): Performed by: STUDENT IN AN ORGANIZED HEALTH CARE EDUCATION/TRAINING PROGRAM

## 2024-08-05 PROCEDURE — 2500000001 HC RX 250 WO HCPCS SELF ADMINISTERED DRUGS (ALT 637 FOR MEDICARE OP)

## 2024-08-05 PROCEDURE — 83735 ASSAY OF MAGNESIUM: CPT

## 2024-08-05 PROCEDURE — 85025 COMPLETE CBC W/AUTO DIFF WBC: CPT

## 2024-08-05 RX ORDER — DULOXETIN HYDROCHLORIDE 30 MG/1
30 CAPSULE, DELAYED RELEASE ORAL EVERY MORNING
Start: 2024-08-05

## 2024-08-05 RX ORDER — TERIFLUNOMIDE 14 MG/1
14 TABLET, FILM COATED ORAL DAILY
Status: DISCONTINUED | OUTPATIENT
Start: 2024-08-05 | End: 2024-08-05 | Stop reason: HOSPADM

## 2024-08-05 RX ADMIN — ONDANSETRON 4 MG: 2 INJECTION INTRAMUSCULAR; INTRAVENOUS at 08:38

## 2024-08-05 RX ADMIN — ASPIRIN 81 MG: 81 TABLET, COATED ORAL at 08:38

## 2024-08-05 RX ADMIN — PANTOPRAZOLE SODIUM 40 MG: 40 TABLET, DELAYED RELEASE ORAL at 06:37

## 2024-08-05 RX ADMIN — INSULIN LISPRO 15 UNITS: 100 INJECTION, SOLUTION INTRAVENOUS; SUBCUTANEOUS at 08:39

## 2024-08-05 RX ADMIN — TERIFLUNOMIDE 14 MG: 14 TABLET, FILM COATED ORAL at 08:38

## 2024-08-05 RX ADMIN — DULOXETINE HYDROCHLORIDE 60 MG: 30 CAPSULE, DELAYED RELEASE ORAL at 08:38

## 2024-08-05 RX ADMIN — LIDOCAINE 4% 1 PATCH: 40 PATCH TOPICAL at 08:38

## 2024-08-05 ASSESSMENT — COGNITIVE AND FUNCTIONAL STATUS - GENERAL
MOVING TO AND FROM BED TO CHAIR: A LITTLE
MOVING FROM LYING ON BACK TO SITTING ON SIDE OF FLAT BED WITH BEDRAILS: A LITTLE
DRESSING REGULAR LOWER BODY CLOTHING: A LITTLE
WALKING IN HOSPITAL ROOM: A LITTLE
STANDING UP FROM CHAIR USING ARMS: A LITTLE
DAILY ACTIVITIY SCORE: 19
HELP NEEDED FOR BATHING: A LITTLE
DRESSING REGULAR UPPER BODY CLOTHING: A LITTLE
CLIMB 3 TO 5 STEPS WITH RAILING: A LITTLE
TURNING FROM BACK TO SIDE WHILE IN FLAT BAD: A LITTLE
PERSONAL GROOMING: A LITTLE
TOILETING: A LITTLE
MOBILITY SCORE: 18

## 2024-08-05 ASSESSMENT — ACTIVITIES OF DAILY LIVING (ADL): LACK_OF_TRANSPORTATION: NO

## 2024-08-05 NOTE — PROGRESS NOTES
08/05/24 0909   Discharge Planning   Living Arrangements Other (Comment)  (room mate)   Support Systems Parent;Family members;Friends/neighbors   Assistance Needed With MS exacerbation   Type of Residence Private residence   Number of Stairs to Enter Residence 6   Number of Stairs Within Residence 0   Do you have animals or pets at home? Yes   Type of Animals or Pets service dog   Who is requesting discharge planning? Patient   Home or Post Acute Services In home services   Type of Home Care Services Home PT;Home OT   Expected Discharge Disposition  Services   Does the patient need discharge transport arranged? No   Financial Resource Strain   How hard is it for you to pay for the very basics like food, housing, medical care, and heating? Not hard   Housing Stability   In the last 12 months, was there a time when you were not able to pay the mortgage or rent on time? N   In the past 12 months, how many times have you moved where you were living? 1   At any time in the past 12 months, were you homeless or living in a shelter (including now)? N   Transportation Needs   In the past 12 months, has lack of transportation kept you from medical appointments or from getting medications? no   In the past 12 months, has lack of transportation kept you from meetings, work, or from getting things needed for daily living? No     Spoke with pt and confirmed with her that her choice was [facility] for home PT/OT. Dr Stanley said she would be discharged today. Informed her of this and then notified him of her choice. CT to follow. [facility] 20/20. ADOD today. Crystal Cabezas BSN/RN-[facility]

## 2024-08-05 NOTE — HH CARE COORDINATION
Home Care received a Referral for Physical Therapy, Occupational Therapy, and Speech Language Pathology. We have processed the referral for a Start of Care on 8/6-8/7.     If you have any questions or concerns, please feel free to contact us at [phone]. Follow the prompts, enter your five digit zip code, and you will be directed to your care team on [facility].

## 2024-08-05 NOTE — PROGRESS NOTES
Social work consult placed for positive medical risk screen. SW reviewed pt's chart and communicated with T[facility]. No SW needs foreseen at this time. SW signing off; available upon request.    AGATHA Pickens, LSW ([id number])   Care Transitions

## 2024-08-05 NOTE — PROGRESS NOTES
Sara Uribe is a 49 y.o. female admitted for Multiple sclerosis exacerbation (Multi). Pharmacy reviewed the patient's [id number] medications and allergies for accuracy.    The list below reflects the PTA list prior to pharmacy medication history. A summary a changes to the PTA medication list has been listed below. Please review each medication in order reconciliation for additional clarification and justification.    Source of information:  T2P    Medications added:    Medications modified:  Duloxetine 30mg every day. Take with 60mg --> 30mg qam  Duloxetine 60mg every day --> 60mg hs  Trazodone 50mg every day --> 50mg hs  Medications to be removed:    Medications of concern:      Prior to Admission Medications   Prescriptions Last Dose Informant Patient Reported? Taking?   DULoxetine (Cymbalta) 30 mg DR capsule 8/4/2024  No Yes   Sig: Take 1 capsule (30 mg) by mouth once daily. To take with 60 mg to total 90 mg once daily. Do not crush or chew.   DULoxetine (Cymbalta) 60 mg DR capsule 8/4/2024  No Yes   Sig: Take 1 capsule (60 mg) by mouth once daily.   SUMAtriptan (Imitrex) 50 mg tablet   No No   Sig: Take 1 tablet (50 mg) by mouth 1 time if needed for migraine (MAY REPEAT DOSE IN 2 HOURS.  MAX 2 TABS IN 24 HOURS).   albuterol 90 mcg/actuation inhaler 8/3/2024  No Yes   Sig: INHALE 2 PUFFS BY MOUTH EVERY 6 HOURS AS NEEDED   aspirin 81 mg EC tablet 8/4/2024  Yes Yes   Sig: Take by mouth once daily.   atorvastatin (Lipitor) 40 mg tablet 8/3/2024  Yes Yes   Sig: Take 1 tablet (40 mg) by mouth once daily at bedtime.   baclofen (Lioresal) 10 mg tablet 8/4/2024  No Yes   Sig: Take 1-2 tablets (10-20 mg) by mouth 3 times a day. For hand tremors   blood sugar diagnostic (True Metrix Glucose Test Strip) strip Unknown  Yes No   Sig: CHECK BLOOD GLUCOSE once daily   brexpiprazole (Rexulti) 1 mg tablet 8/4/2024  No Yes   Sig: Take 1 tablet (1 mg) by mouth once daily.   dapagliflozin (Farxiga) 5 mg 8/4/2024  Yes  Yes   Sig: Take 1 tablet (5 mg) by mouth once daily in the morning. Take before meals.   dexlansoprazole (Dexilant) 60 mg DR capsule 8/4/2024  Yes Yes   Sig: Take 1 capsule (60 mg) by mouth once daily.   lamoTRIgine (LaMICtal) 150 mg tablet 8/3/2024  Yes Yes   Sig: Take 1 tablet (150 mg) by mouth once daily at bedtime.   levothyroxine (LevoxyL) 25 mcg tablet 8/4/2024  Yes Yes   Sig: Take by mouth once daily in the morning. Take before meals.   melatonin 5 mg tablet 8/3/2024  Yes Yes   Sig: Take 1 tablet (5 mg) by mouth as needed at bedtime.   meloxicam (Mobic) 15 mg tablet 8/4/2024  Yes Yes   Sig: Take 1 tablet (15 mg) by mouth once daily.   metFORMIN XR (Glucophage-XR) 750 mg 24 hr tablet 8/4/2024  Yes Yes   Sig: Take 1 tablet (750 mg) by mouth 2 times a day. Do not crush, chew, or split.   teriflunomide (AUBAGIO) 14 mg tablet tablet 8/4/2024  No Yes   Sig: TAKE 1 TABLET BY MOUTH 1 TIME A DAY   traZODone (Desyrel) 50 mg tablet 8/3/2024  Yes Yes   Sig: Take 1 tablet (50 mg) by mouth once daily.      Facility-Administered Medications: None       Sydney Laboy

## 2024-08-05 NOTE — DISCHARGE SUMMARY
DISCHARGE SUMMARY     Discharge Diagnosis  Multiple sclerosis exacerbation (Multi)    This discharge took greater than 35 minutes.    Test Results Pending At Discharge  Pending Labs       Order Current Status    Extra Urine Gray Tube In process    Urinalysis with Reflex Culture and Microscopic In process            Hospital Course   Sara Uribe is a 49 y.o. female with PMHx s/f MS relapsing-remitting, HTN, hypothyroidism, T2DM, anxiety and depression, bipolar disorder, fibromyalgia, peripheral neuropathy presenting with concerns of MS flare. She states over the past week, she has been dealing with right foot drop and back spasms. Her speech is worsening, with stutters and difficulty forming words and thoughts. Her back spasms are worsening, felt like sharp stabbing pain, with radiation of pain down to her bilateral legs. She feel paresthesias below her knees to her feet, with burning skin sensations all over her body. She has chronic left-sided vision loss, and the right sided vision with intermittent blurry and vision loss. Her mom reports that she doesn't have stuttering at baseline. She states the symptoms are similar to her past MS flare-ups.  Denies F/C/n/v, chest pain, shortness of breath, dizziness, hearing loss, abdominal pain, changes in urinary or bowel symptoms, syncope.     ED Course (Summary):   Vitals on presentation: 98.1 F, 103 bpm, 16 RR, 140/98, 99% on RA  Labs: CMP-glucose 210, sodium 136, potassium 4.4  Troponin 4  CBC unremarkable  Imaging: CXR-No evidence of acute cardiopulmonary process   CT head- No evidence of acute cortical infarct or intracranial hemorrhage. No evidence of intracranial hemorrhage or displaced skull fracture   Interventions: Solu-Medrol 1000 mg IV, Zofran 4 mg IV, admission for further management    Exacerbation of MS symptoms  -IV Solu-Medrol 1 g given in ED  -MR brain without signs of active demyelination (re-demonstrated known lesions)    -neuro consult ordered;  ED was notified that neuro coverage will not be available for a week starting Mon, and the ED provider consulted a neurologist, who recommended that patient can stay at this hospital  -Patient requesting home PT/OT/SLP for symptom exacerbation.      DM-II with hyperglycemia  -A1C is now 9.2%. She will discuss this with her PCP   -Continue home meds for now     Bipolar disorder  -Continue home meds     HTN, hypothyroidism, anxiety and depression  -Continue home meds    Pertinent Physical Exam At Time of Discharge  Constitutional: Pleasant and cooperative. Word stuttering. Sitting up in bed in no acute distress.   Skin: Warm and dry; no obvious lesions, rashes, pallor, or jaundice. Good turgor.   Eyes: EOMI. Anicteric sclera. Make eye contacts.  ENT: Mucous membranes moist; no obvious injury or deformity appreciated.   Head and Neck: Normocephalic, atraumatic. ROM preserved. Trachea midline.   Respiratory: Nonlabored on RA. Lungs clear to auscultation bilaterally without obvious adventitious sounds. Chest rise is equal.  Cardiovascular: RRR. No gross murmur, gallop, or rub. Extremities are warm and well-perfused with good capillary refill (< 3 seconds). No chest wall tenderness.   GI: Abdomen soft, nontender, nondistended. No obvious organomegaly appreciated. Bowel sounds are present and normoactive.  : No CVA tenderness.   MSK: No gross abnormalities appreciated. Tenderness to right lateral lumbar area. Right foot drop.   Extremities: No cyanosis, edema, or clubbing evident. Neurovascularly intact.   Neuro: A&Ox3. Able to respond to questions appropriately and clearly. Decreased sensation in left neck. 5/5 strength left foot. 4/5 strength in right hip flexion.  Psych: Appropriate mood and behavior.    Home Medications     Medication List      CHANGE how you take these medications     * DULoxetine 60 mg DR capsule; Commonly known as: Cymbalta; Take 1   capsule (60 mg) by mouth once daily.; What changed: when to take  this   * DULoxetine 30 mg DR capsule; Commonly known as: Cymbalta; Take 1   capsule (30 mg) by mouth once daily in the morning.; What changed: when to   take this, additional instructions  * This list has 2 medication(s) that are the same as other medications   prescribed for you. Read the directions carefully, and ask your doctor or   other care provider to review them with you.     CONTINUE taking these medications     albuterol 90 mcg/actuation inhaler; INHALE 2 PUFFS BY MOUTH EVERY 6   HOURS AS NEEDED   aspirin 81 mg EC tablet   atorvastatin 40 mg tablet; Commonly known as: Lipitor   baclofen 10 mg tablet; Commonly known as: Lioresal; Take 1-2 tablets   (10-20 mg) by mouth 3 times a day. For hand tremors   brexpiprazole 1 mg tablet; Commonly known as: Rexulti; Take 1 tablet (1   mg) by mouth once daily.   Dexilant 60 mg DR capsule; Generic drug: dexlansoprazole   Farxiga 5 mg; Generic drug: dapagliflozin propanediol   lamoTRIgine 150 mg tablet; Commonly known as: LaMICtal   LevoxyL 25 mcg tablet; Generic drug: levothyroxine   melatonin 5 mg tablet   meloxicam 15 mg tablet; Commonly known as: Mobic   metFORMIN  mg 24 hr tablet; Commonly known as: Glucophage-XR   SUMAtriptan 50 mg tablet; Commonly known as: Imitrex; Take 1 tablet (50   mg) by mouth 1 time if needed for migraine (MAY REPEAT DOSE IN 2 HOURS.    MAX 2 TABS IN 24 HOURS).   teriflunomide 14 mg tablet tablet; Commonly known as: AUBAGIO; TAKE 1   TABLET BY MOUTH 1 TIME A DAY   traZODone 50 mg tablet; Commonly known as: Desyrel   True Metrix Glucose Test Strip strip; Generic drug: blood sugar   diagnostic       Outpatient Follow-Up  No follow-ups on file.     Donald Stanley MD PhD  8/5/2024  9:02 AM

## 2024-08-05 NOTE — CARE PLAN
The patient's goals for the shift include feel better    The clinical goals for the shift include Pt will remain free of falls throughout shift      Problem: Fall/Injury  Goal: Not fall by end of shift  Outcome: Progressing  Goal: Be free from injury by end of the shift  Outcome: Progressing  Goal: Verbalize understanding of personal risk factors for fall in the hospital  Outcome: Progressing  Goal: Verbalize understanding of risk factor reduction measures to prevent injury from fall in the home  Outcome: Progressing  Goal: Use assistive devices by end of the shift  Outcome: Progressing  Goal: Pace activities to prevent fatigue by end of the shift  Outcome: Progressing

## 2024-08-06 ENCOUNTER — HOME CARE VISIT (OUTPATIENT)
Dept: HOME HEALTH SERVICES | Facility: HOME HEALTH | Age: 49
End: 2024-08-06
Payer: COMMERCIAL

## 2024-08-06 ENCOUNTER — PATIENT OUTREACH (OUTPATIENT)
Dept: CARE COORDINATION | Facility: CLINIC | Age: 49
End: 2024-08-06
Payer: COMMERCIAL

## 2024-08-06 VITALS
RESPIRATION RATE: 18 BRPM | SYSTOLIC BLOOD PRESSURE: 131 MMHG | OXYGEN SATURATION: 96 % | HEART RATE: 98 BPM | DIASTOLIC BLOOD PRESSURE: 80 MMHG

## 2024-08-06 PROCEDURE — G0151 HHCP-SERV OF PT,EA 15 MIN: HCPCS

## 2024-08-06 SDOH — HEALTH STABILITY: PHYSICAL HEALTH: EXERCISE COMMENTS: INITIATED BLE ASSESSMENT

## 2024-08-06 ASSESSMENT — ACTIVITIES OF DAILY LIVING (ADL)
AMBULATION ASSISTANCE ON FLAT SURFACES: 1
CURRENT_FUNCTION: MINIMUM ASSIST
ENTERING_EXITING_HOME: MODERATE ASSIST
OASIS_M1830: 05
AMBULATION ASSISTANCE: 1
PHYSICAL TRANSFERS ASSESSED: 1
AMBULATION ASSISTANCE: MINIMUM ASSIST
AMBULATION_DISTANCE/DURATION_TOLERATED: 12FT

## 2024-08-06 ASSESSMENT — ENCOUNTER SYMPTOMS
DENIES PAIN: 1
PERSON REPORTING PAIN: PATIENT
MUSCLE WEAKNESS: 1

## 2024-08-06 NOTE — PROGRESS NOTES
Discharge Facility: [facility]   Discharge Diagnosis: Multiple sclerosis exacerbation (Multi)   Admission Date: 8-4-24  Discharge Date: 8-5-24    PCP Appointment Date: Message routed to office for scheduling     Specialist Appointment Date: 9-24-24 Neurology   Hospital Encounter and Summary Linked: Yes     Medications  Medications reviewed with patient/caregiver?: Yes (8/6/2024  9:57 AM)  Is the patient having any side effects they believe may be caused by any medication additions or changes?: No (8/6/2024  9:57 AM)  Does the patient have all medications ordered at discharge?: Yes (8/6/2024  9:57 AM)  Care Management Interventions: No intervention needed (8/6/2024  9:57 AM)  Prescription Comments: CHANGE how you take: DULoxetine (Cymbalta) (8/6/2024  9:57 AM)  Is the patient taking all medications as directed (includes completed medication regime)?: Yes (8/6/2024  9:57 AM)  Care Management Interventions: Provided patient education (8/6/2024  9:57 AM)    Appointments  Does the patient have a primary care provider?: Yes (8/6/2024  9:57 AM)  Care Management Interventions: Educated patient on importance of making appointment (8/6/2024  9:57 AM)  Has the patient kept scheduled appointments due by today?: Yes (8/6/2024  9:57 AM)  Care Management Interventions: Advised to schedule with specialist (8/6/2024  9:57 AM)    Self Management  What is the home health agency?: PT/ OT/ SPL - : Worsening of multiple sclerosis (8/6/2024  9:57 AM)  Has home health visited the patient within 72 hours of discharge?: Yes (8/6/2024  9:57 AM)    Patient Teaching  Does the patient have access to their discharge instructions?: Yes (8/6/2024  9:57 AM)  Care Management Interventions: Reviewed instructions with patient (8/6/2024  9:57 AM)  What is the patient's perception of their health status since discharge?: Improving (8/6/2024  9:57 AM)  Is the patient/caregiver able to teach back the hierarchy of who to call/visit for symptoms/problems? PCP,  Specialist, Home Health nurse, Urgent Care, ED, 911: Yes (8/6/2024  9:57 AM)      Sophie Rcihter LPN

## 2024-08-07 ENCOUNTER — HOME CARE VISIT (OUTPATIENT)
Dept: HOME HEALTH SERVICES | Facility: HOME HEALTH | Age: 49
End: 2024-08-07
Payer: COMMERCIAL

## 2024-08-07 ENCOUNTER — APPOINTMENT (OUTPATIENT)
Dept: RADIOLOGY | Facility: CLINIC | Age: 49
End: 2024-08-07
Payer: COMMERCIAL

## 2024-08-07 PROCEDURE — G0153 HHCP-SVS OF S/L PATH,EA 15MN: HCPCS

## 2024-08-09 ENCOUNTER — TELEPHONE (OUTPATIENT)
Dept: PRIMARY CARE | Facility: CLINIC | Age: 49
End: 2024-08-09

## 2024-08-09 ENCOUNTER — OFFICE VISIT (OUTPATIENT)
Dept: PRIMARY CARE | Facility: CLINIC | Age: 49
End: 2024-08-09
Payer: COMMERCIAL

## 2024-08-09 ENCOUNTER — HOME CARE VISIT (OUTPATIENT)
Dept: HOME HEALTH SERVICES | Facility: HOME HEALTH | Age: 49
End: 2024-08-09
Payer: COMMERCIAL

## 2024-08-09 VITALS
OXYGEN SATURATION: 97 % | SYSTOLIC BLOOD PRESSURE: 128 MMHG | RESPIRATION RATE: 18 BRPM | WEIGHT: 213.8 LBS | DIASTOLIC BLOOD PRESSURE: 70 MMHG | HEIGHT: 69 IN | HEART RATE: 78 BPM | BODY MASS INDEX: 31.67 KG/M2

## 2024-08-09 VITALS
DIASTOLIC BLOOD PRESSURE: 78 MMHG | TEMPERATURE: 98.1 F | SYSTOLIC BLOOD PRESSURE: 125 MMHG | OXYGEN SATURATION: 97 % | HEART RATE: 111 BPM

## 2024-08-09 DIAGNOSIS — R26.2 DIFFICULTY IN WALKING INVOLVING ANKLE AND FOOT JOINT: ICD-10-CM

## 2024-08-09 DIAGNOSIS — E11.65 TYPE 2 DIABETES MELLITUS WITH HYPERGLYCEMIA, WITHOUT LONG-TERM CURRENT USE OF INSULIN (MULTI): ICD-10-CM

## 2024-08-09 DIAGNOSIS — M54.50 CHRONIC LOW BACK PAIN, UNSPECIFIED BACK PAIN LATERALITY, UNSPECIFIED WHETHER SCIATICA PRESENT: ICD-10-CM

## 2024-08-09 DIAGNOSIS — G35 MULTIPLE SCLEROSIS, RELAPSING-REMITTING (MULTI): Primary | ICD-10-CM

## 2024-08-09 DIAGNOSIS — G35 MS (MULTIPLE SCLEROSIS) (MULTI): ICD-10-CM

## 2024-08-09 DIAGNOSIS — M43.06 LUMBAR SPONDYLOLYSIS: ICD-10-CM

## 2024-08-09 DIAGNOSIS — Z09 HOSPITAL DISCHARGE FOLLOW-UP: Primary | ICD-10-CM

## 2024-08-09 DIAGNOSIS — G89.29 CHRONIC LOW BACK PAIN, UNSPECIFIED BACK PAIN LATERALITY, UNSPECIFIED WHETHER SCIATICA PRESENT: ICD-10-CM

## 2024-08-09 LAB
ATRIAL RATE: 114 BPM
P AXIS: 54 DEGREES
P OFFSET: 190 MS
P ONSET: 145 MS
PR INTERVAL: 156 MS
Q ONSET: 223 MS
QRS COUNT: 19 BEATS
QRS DURATION: 66 MS
QT INTERVAL: 324 MS
QTC CALCULATION(BAZETT): 446 MS
QTC FREDERICIA: 401 MS
R AXIS: -12 DEGREES
T AXIS: 10 DEGREES
T OFFSET: 385 MS
VENTRICULAR RATE: 114 BPM

## 2024-08-09 PROCEDURE — 3008F BODY MASS INDEX DOCD: CPT

## 2024-08-09 PROCEDURE — 3078F DIAST BP <80 MM HG: CPT

## 2024-08-09 PROCEDURE — 3046F HEMOGLOBIN A1C LEVEL >9.0%: CPT

## 2024-08-09 PROCEDURE — 3074F SYST BP LT 130 MM HG: CPT

## 2024-08-09 PROCEDURE — 4004F PT TOBACCO SCREEN RCVD TLK: CPT

## 2024-08-09 PROCEDURE — G0152 HHCP-SERV OF OT,EA 15 MIN: HCPCS

## 2024-08-09 PROCEDURE — 99496 TRANSJ CARE MGMT HIGH F2F 7D: CPT

## 2024-08-09 RX ORDER — METFORMIN HYDROCHLORIDE 1000 MG/1
1000 TABLET ORAL
Qty: 180 TABLET | Refills: 0 | Status: SHIPPED | OUTPATIENT
Start: 2024-08-09 | End: 2024-11-07

## 2024-08-09 RX ORDER — CYCLOBENZAPRINE HCL 10 MG
10 TABLET ORAL NIGHTLY PRN
Qty: 30 TABLET | Refills: 0 | Status: SHIPPED | OUTPATIENT
Start: 2024-08-09 | End: 2024-09-08

## 2024-08-09 ASSESSMENT — ACTIVITIES OF DAILY LIVING (ADL)
ADLS_COMMENTS: ANCE IS REQUIRED TO MANIPULATE CHAIR TO TABLE, BED, ETC.   4    THE PATIENT CAN PROPEL SELF FOR A REASONABLE DURATION OVER REGULARLY ENCOUNTERED TERRAIN. MINIMAL ASSISTANCE MAY STILL BE REQUIRED IN “TIGHT CORNERS” OR TO NEGOTIATE A KERB 100MM HIGH.
ADLS_COMMENTS: NAL HYGIENE AND IS DEPENDENT IN ALL ASPECTS.   1    ASSISTANCE IS REQUIRED IN ALL STEPS OF PERSONAL HYGIENE, BUT PATIENT ABLE TO MAKE SOME CONTRIBUTION.   3    SOME ASSISTANCE IS REQUIRED IN ONE OR MORE STEPS OF PERSONAL HYGIENE.   4    PATIENT IS AB
ADLS_COMMENTS: TINENCE AIDS SUCH AS PAD, ETC. THE PATIENT MAY REQUIRE SUPERVISION WITH THE USE OF SUPPOSITORY OR ENEMA AND HAS OCCASIONAL ACCIDENTS.   10   THE PATIENT CAN CONTROL BOWELS AND HAS NO ACCIDENTS, CAN USE SUPPOSITORY, OR TAKE AN ENEMA WHEN NECESSARY.
ADLS_COMMENTS: N.   8    ASSISTANCE IS REQUIRED WITH REACHING AIDS AND/OR THEIR MANIPULATION. ONE PERSON IS REQUIRED TO OFFER ASSISTANCE.   12   THE PATIENT IS INDEPENDENT IN AMBULATION BUT UNABLE TO WALK 50 METRES WITHOUT HELP, OR SUPERVISION IS NEEDED FOR CONFIDE
ADLS_COMMENTS: NCE OR SAFETY IN HAZARDOUS SITUATIONS.   15   THE PATIENT MUST BE ABLE TO WEAR BRACES IF REQUIRED, LOCK AND UNLOCK THESE BRACES ASSUME STANDING POSITION, SIT DOWN, AND PLACE THE NECESSARY AIDS INTO POSITION FOR USE. THE PATIENT MUST BE ABLE TO CRUTCH
ADLS_COMMENTS: RIC NEEDS TO BE ADMINISTERED.   2    CAN MANIPULATE AN EATING DEVICE, USUALLY A SPOON, BUT SOMEONE MUST PROVIDE ACTIVE ASSISTANCE DURING THE MEAL.   5    ABLE TO FEED SELF WITH SUPERVISION. ASSISTANCE IS REQUIRED WITH ASSOCIATED TASKS SUCH AS PUTTING
ADLS_COMMENTS: LE TO CONDUCT OWN PERSONAL HYGIENE BUT REQUIRES MIN ASSIST BEFORE AND/OR AFTER THE OPERATION.   5    THE PATIENT CAN WASH HIS/HER HANDS AND FACE, COMB HAIR, CLEAN TEETH AND SHAVE. A MALE PATIENT MAY USE ANY KIND OF RAZOR BUT MUST INSERT THE BLADE, OR
ADLS_COMMENTS: EVERE DEPENDENCE     21 - 60  **MODERATE DEPENDENCE     61 - 90  SLIGHT DEPENDENCE      91 - 99  INDEPENDENCE        100    SCORE PREDICTION    LESS THAN 40    UNLIKELY TO GO HOME - DEPENDENT IN MOBILITY - DEPENDENT IN SELF CARE   60    PIVOTAL SCORE
ADLS_COMMENTS: ES, CANES, OR A WALKARETTE, AND WALK 50 METRES WITHOUT HELP OR SUPERVISION.     AMBULATION/WHEELCHAIR * (IF UNABLE TO WALK) ONLY USE THIS ITEM IF THE PATIENT IS RATED “0” FOR AMBULATION, AND THEN ONLY IF THE PATIENT HAS BEEN TRAINED IN WHEELCHAIR MAN
ADLS_COMMENTS: THE PATIENT MAY USE A BATHTUB, A SHOWER, OR TAKE A COMPLETE SPONGE BATH. THE PATIENT MUST BE ABLE TO DO ALL THE STEPS OF WHICHEVER METHOD IS EMPLOYED WITHOUT ANOTHER PERSON BEING PRESENT.     DRESSING   0     THE PATIENT IS DEPENDENT IN ALL ASPECTS
ADLS_COMMENTS: AND NIGHT, AND/OR IS INDEPENDENT WITH INTERNAL OR EXTERNAL DEVICES.     BATHING   0    TOTAL DEPENDENCE IN BATHING SELF.   1    ASSISTANCE IS REQUIRED IN ALL ASPECTS OF BATHING, BUT PATIENT IS ABLE TO MAKE SOME CONTRIBUTION.   3    ASSISTANCE IS REQ
ADLS_COMMENTS: EMENT OF CLOTHING, TRANSFERRING, OR WASHING HANDS.   8    SUP MAY BE REQUIRED FOR SAFETY WITH NORMAL TOILET. BSC MAY BE USED AT NIGHT, ASSIST FOR EMPTYING AND CLEANING.   10   THE PATIENT IS ABLE TO GET ON/OFF THE TOILET, FASTEN CLOTHING AND USE TOIL
ADLS_COMMENTS: NG   0    THE PATIENT IS UNABLE TO CLIMB STAIRS.   2    ASSISTANCE IS REQUIRED IN ALL ASPECTS OF CHAIR CLIMBING, INCLUDING ASSISTANCE WITH WALKING AIDS.   5    THE PATIENT IS ABLE TO ASCEND/DESCEND BUT IS UNABLE TO CARRY WALKING AIDS AND NEEDS SUPERV
ADLS_COMMENTS: THE TRANSFER.   8    THE TRANSFER REQUIRES THE ASSISTANCE OF ONE OTHER PERSON. ASSISTANCE MAY BE REQUIRED IN ANY ASPECT OF THE TRANSFER.   12  THE PRESENCE OF ANOTHER PERSON IS REQUIRED EITHER AS A CONFIDENCE MEASURE, OR TO PROVIDE SUPERVISION FOR S
ADLS_COMMENTS: 5    TO PROPEL WHEELCHAIR INDEPENDENTLY, THE PATIENT MUST BE ABLE TO GO AROUND CORNERS, TURN AROUND, MANOEUVRE THE CHAIR TO A TABLE, BED, TOILET, ETC. THE PATIENT MUST BE ABLE TO PUSH A CHAIR AT LEAST 50 METRES AND NEGOTIATE KERB.       STAIR CLIMBI
ADLS_COMMENTS: OF DRESSING AND IS UNABLE TO PARTICIPATE IN THE ACTIVITY.   2     THE PATIENT IS ABLE TO PARTICIPATE TO SOME DEGREE, BUT IS DEPENDENT IN ALL ASPECTS OF DRESSING.   5     ASSISTANCE IS NEEDED IN PUTTING ON, AND/OR REMOVING ANY CLOTHING.   8     ONLY M
ADLS_COMMENTS: UIRED WITH EITHER TRANSFER TO SHOWER/BATH OR WITH WASHING OR DRYING; INCLUDING INABILITY TO COMPLETE A TASK BECAUSE OF CONDITION OR DISEASE, ETC.   4    SUPERVISION IS REQUIRED FOR SAFETY IN ADJUSTING THE WATER TEMPERATURE, OR IN THE TRANSFER.   5
ADLS_COMMENTS: WHERE PATIENTS MOVE FROM DEPENDENCY TO ASSISTED INDEPENDENCE.   **60 - 80    IF LIVING ALONE WILL PROBABLY NEED A NUMBER OF COMMUNITY SERVICES TO COPE.   MORE THAN 85     LIKELY TO BE DISCHARGED TO COMMUNITY LIVING - INDEPENDENT IN TRANSFERS AND ABL
ADLS_COMMENTS: MILK/SUGAR INTO TEA, SALT, PEPPER, SPREADING BUTTER, TURNING A PLATE OR OTHER “SET UP” ACTIVITIES.   8    INDEP WITH PREPARED TRAY, MAY NEED MEAT CUT, MILK CARTON/JAR LID OPENED. ANOTHER PERSON IS NOT REQUIRED  10   THE PATIENT CAN FEED SELF FROM A
ADLS_COMMENTS: AFETY.   15  THE PATIENT CAN SAFELY APPROACH THE BED WALKING OR IN A WHEELCHAIR, LOCK BRAKES, LIFT FOOTRESTS, OR POSITION WALKING AID, MOVE SAFELY TO BED, LIE DOWN, COME TO A SITTING POSITION ON THE SIDE OF THE BED, CHANGE THE POSITION OF THE WHEELCH
ADLS_COMMENTS: E TO WALK OR USE WHEELCHAIR INDEPENDENTLY.
ADLS_COMMENTS: GENERALLY DRY BY DAY, BUT NOT AT NIGHT AND NEEDS SOME ASSISTANCE WITH THE DEVICES.   8    GENERALLY DRY BY DAY AND NIGHT, MAY HAVE OCCAS ACCIDENT OR NEED MIN ASSIST WITH INTERNAL OR EXTERNAL DEVICES.   10   THE PATIENT IS ABLE TO CONTROL BLADDER DAY
ADLS_COMMENTS: AIR, TRANSFER BACK INTO IT SAFELY AND/OR GRASP AID AND STAND. THE PATIENT MUST BE INDEPENDENT IN ALL PHASES OF THIS ACTIVITY.     AMBULATION    0    DEPENDENT IN AMBULATION  3    CONSTANT PRESENCE OF ONE OR MORE ASSISTANT IS REQUIRED DURING AMBULATIO
ADLS_COMMENTS: BLADDER CONTROL   0    THE PATIENT IS DEPENDENT IN BLADDER MANAGEMENT, IS INCONTINENT, OR HAS INDWELLING CATHETER.   2    THE PATIENT IS INCONTINENT BUT IS ABLE TO ASSIST WITH THE APPLICATION OF AN INTERNAL OR EXTERNAL DEVICE.   5    THE PATIENT IS
ADLS_COMMENTS: CHAIR/BED TRANSFERS  0    UNABLE TO PARTICIPATE IN A TRANSFER. TWO ATTENDANTS ARE REQUIRED TO TRANSFER THE PATIENT WITH OR WITHOUT A MECHANICAL DEVICE.   3    ABLE TO PARTICIPATE BUT MAXIMUM ASSISTANCE OF ONE OTHER PERSON IS REQUIRE IN ALL ASPECTS OF
ADLS_COMMENTS: POSITION, AND WITH BOWEL MOVEMENT FACILITATORY TECHNIQUES.   5   THE PATIENT CAN ASSUME APPROPRIATE POSITION, BUT CANNOT USE FACILITATORY TECHNIQUES OR CLEAN SELF WITHOUT ASSISTANCE AND HAS FREQUENT ACCIDENTS.   8    ASSISTANCE IS REQUIRED WITH INCON
ADLS_COMMENTS: ET PAPER WITHOUT HELP. IF NECESSARY, THE PATIENT MAY USE A BED PAN OR COMMODE OR URINAL AT NIGHT, BUT MUST BE ABLE TO EMPTY IT AND CLEAN IT.     BOWEL CONTROL   0   THE PATIENT IS BOWEL INCONTINENT.   2   THE PATIENT NEEDS HELP TO ASSUME APPROPRIATE
ADLS_COMMENTS: AGEMENT.   0    DEPENDENT IN WHEELCHAIR AMBULATION.   1    PATIENT CAN PROPEL SELF SHORT DISTANCES ON FLAT SURFACE, BUT ASSISTANCE IS REQUIRED FOR ALL OTHER STEPS OF WHEELCHAIR MANAGEMENT.  3    PRESENCE OF ONE PERSON IS NECESSARY AND CONSTANT ASSIST
ADLS_COMMENTS: INIMAL ASSISTANCE IS REQUIRED WITH FASTENING CLOTHING SUCH AS BUTTONS, ZIPS, BRA, SHOES, ETC.   10   THE PATIENT IS ABLE TO PUT ON, REMOVE, CORSET, BRACES, AS PRESCRIBED.     PERSONAL HYGIENE (GROOMING)   0    THE PATIENT IS UNABLE TO ATTEND TO PERSO
ADLS_COMMENTS: ISION AND ASSISTANCE.   8    GENERALLY NO ASSISTANCE IS REQUIRED. AT TIMES SUP IS REQUIRED FOR SAFETY DUE TO MORNING STIFFNESS, SOB, ETC  10   THE PATIENT IS ABLE TO GO UP/DOWN A FLIGHT OF STAIRS SAFELY WITHOUT HELP OR SUPERVISION,USE HAND RAILS, CAN
ADLS_COMMENTS: PLUG IN THE RAZOR WITHOUT HELP, AS WELL AS RETRIEVE IT FROM THE DRAWER OR CABINET. A FEMALE PATIENT MUST APPLY HER OWN MAKE-UP, IF USED, BUT NEED NOT BRAID OR STYLE HER HAIR.     FEEDING    0    DEPENDENT IN ALL ASPECTS AND NEEDS TO BE FED, NASOGAST

## 2024-08-09 ASSESSMENT — ANXIETY QUESTIONNAIRES
GAD7 TOTAL SCORE: 10
IF YOU CHECKED OFF ANY PROBLEMS ON THIS QUESTIONNAIRE, HOW DIFFICULT HAVE THESE PROBLEMS MADE IT FOR YOU TO DO YOUR WORK, TAKE CARE OF THINGS AT HOME, OR GET ALONG WITH OTHER PEOPLE: SOMEWHAT DIFFICULT
7. FEELING AFRAID AS IF SOMETHING AWFUL MIGHT HAPPEN: SEVERAL DAYS
5. BEING SO RESTLESS THAT IT IS HARD TO SIT STILL: NOT AT ALL
3. WORRYING TOO MUCH ABOUT DIFFERENT THINGS: MORE THAN HALF THE DAYS
4. TROUBLE RELAXING: MORE THAN HALF THE DAYS
1. FEELING NERVOUS, ANXIOUS, OR ON EDGE: NOT AT ALL
2. NOT BEING ABLE TO STOP OR CONTROL WORRYING: MORE THAN HALF THE DAYS
6. BECOMING EASILY ANNOYED OR IRRITABLE: NEARLY EVERY DAY

## 2024-08-09 ASSESSMENT — ENCOUNTER SYMPTOMS
PAIN LOCATION - PAIN DURATION: ACUTE
PAIN: 1
BACK PAIN: 1
DEPRESSION: 1
ARTHRALGIAS: 1
PAIN LOCATION - PAIN QUALITY: ACHING
PAIN LOCATION: BACK
PAIN LOCATION - EXACERBATING FACTORS: ACTIVITY
CARDIOVASCULAR NEGATIVE: 1
PAIN LOCATION - PAIN DURATION: CHRONIC
CONSTITUTIONAL NEGATIVE: 1
PAIN LOCATION - PAIN FREQUENCY: FREQUENT
PAIN LOCATION - PAIN FREQUENCY: INFREQUENT
LOSS OF SENSATION IN FEET: 0
PAIN LOCATION - PAIN QUALITY: ACHING
PAIN LOCATION - RELIEVING FACTORS: REST
PAIN LOCATION - RELIEVING FACTORS: REST
RESPIRATORY NEGATIVE: 1
PERSON REPORTING PAIN: PATIENT
OCCASIONAL FEELINGS OF UNSTEADINESS: 1
PAIN LOCATION - PAIN SEVERITY: 7/10
PAIN LOCATION - PAIN SEVERITY: 4/10
PAIN LOCATION: HEAD
PAIN LOCATION - EXACERBATING FACTORS: ACTIVITY

## 2024-08-09 ASSESSMENT — PATIENT HEALTH QUESTIONNAIRE - PHQ9
1. LITTLE INTEREST OR PLEASURE IN DOING THINGS: NEARLY EVERY DAY
10. IF YOU CHECKED OFF ANY PROBLEMS, HOW DIFFICULT HAVE THESE PROBLEMS MADE IT FOR YOU TO DO YOUR WORK, TAKE CARE OF THINGS AT HOME, OR GET ALONG WITH OTHER PEOPLE: SOMEWHAT DIFFICULT
SUM OF ALL RESPONSES TO PHQ QUESTIONS 1-9: 15
2. FEELING DOWN, DEPRESSED OR HOPELESS: NEARLY EVERY DAY
7. TROUBLE CONCENTRATING ON THINGS, SUCH AS READING THE NEWSPAPER OR WATCHING TELEVISION: NOT AT ALL
4. FEELING TIRED OR HAVING LITTLE ENERGY: NEARLY EVERY DAY
6. FEELING BAD ABOUT YOURSELF - OR THAT YOU ARE A FAILURE OR HAVE LET YOURSELF OR YOUR FAMILY DOWN: NEARLY EVERY DAY
8. MOVING OR SPEAKING SO SLOWLY THAT OTHER PEOPLE COULD HAVE NOTICED. OR THE OPPOSITE, BEING SO FIGETY OR RESTLESS THAT YOU HAVE BEEN MOVING AROUND A LOT MORE THAN USUAL: NOT AT ALL
5. POOR APPETITE OR OVEREATING: NOT AT ALL
9. THOUGHTS THAT YOU WOULD BE BETTER OFF DEAD, OR OF HURTING YOURSELF: NOT AT ALL
SUM OF ALL RESPONSES TO PHQ9 QUESTIONS 1 AND 2: 6
3. TROUBLE FALLING OR STAYING ASLEEP OR SLEEPING TOO MUCH: NEARLY EVERY DAY

## 2024-08-09 ASSESSMENT — COLUMBIA-SUICIDE SEVERITY RATING SCALE - C-SSRS
2. HAVE YOU ACTUALLY HAD ANY THOUGHTS OF KILLING YOURSELF?: NO
1. IN THE PAST MONTH, HAVE YOU WISHED YOU WERE DEAD OR WISHED YOU COULD GO TO SLEEP AND NOT WAKE UP?: NO
6. HAVE YOU EVER DONE ANYTHING, STARTED TO DO ANYTHING, OR PREPARED TO DO ANYTHING TO END YOUR LIFE?: NO

## 2024-08-09 ASSESSMENT — PAIN SCALES - GENERAL: PAINLEVEL: 8

## 2024-08-09 NOTE — TELEPHONE ENCOUNTER
[facility] visiting nurse called to let you know they would like a order for transfer tub bench to be sent to [facility] fax number is [phone].

## 2024-08-09 NOTE — PROGRESS NOTES
"Patient: Sara Uribe  : 1975  PCP: Julia Molina MD  MRN: [id number]  Program: Transitional Care Management  Status: Enrolled  Effective Dates: 2024 - present  Responsible Staff: Sophie Richter LPN  Social Determinants to be Addressed: No information to display      Sara Uribe is a 49 y.o. female presenting today for follow-up after being discharged from the hospital 5 days ago. The main problem requiring admission was MS flare up. . The discharge summary and/or Transitional Care Management documentation was reviewed. Medication reconciliation was performed as indicated via the \"Gregory as Reviewed\" timestamp.     Sara Uribe was contacted by Transitional Care Management services two days after her discharge. This encounter and supporting documentation was reviewed.    Review of Systems   Constitutional: Negative.    Respiratory: Negative.     Cardiovascular: Negative.    Musculoskeletal:  Positive for arthralgias and back pain.       /70 (BP Location: Left arm, Patient Position: Sitting, BP Cuff Size: Adult)   Pulse 78   Resp 18   Ht 1.753 m (5' 9\")   Wt 97 kg (213 lb 12.8 oz)   SpO2 97%   BMI 31.57 kg/m²     Physical Exam  Vitals and nursing note reviewed.   Constitutional:       Appearance: Normal appearance.      Comments: Difficulty with speech   HENT:      Head: Normocephalic and atraumatic.      Right Ear: Tympanic membrane normal.      Left Ear: Tympanic membrane normal.      Nose: Nose normal.      Mouth/Throat:      Mouth: Mucous membranes are moist.      Pharynx: Oropharynx is clear.   Eyes:      Extraocular Movements: Extraocular movements intact.      Conjunctiva/sclera: Conjunctivae normal.      Pupils: Pupils are equal, round, and reactive to light.   Cardiovascular:      Rate and Rhythm: Normal rate and regular rhythm.   Pulmonary:      Effort: Pulmonary effort is normal.      Breath sounds: Normal breath sounds.   Abdominal:      General: Bowel sounds " are normal.      Palpations: Abdomen is soft.   Musculoskeletal:         General: Normal range of motion.      Cervical back: Normal range of motion and neck supple.   Skin:     General: Skin is warm.      Capillary Refill: Capillary refill takes less than 2 seconds.   Neurological:      General: No focal deficit present.      Mental Status: She is alert and oriented to person, place, and time. Mental status is at baseline.      Gait: Gait abnormal.   Psychiatric:         Mood and Affect: Mood normal.         Behavior: Behavior normal.         Thought Content: Thought content normal.         Judgment: Judgment normal.         The complexity of medical decision making for this patient's transitional care is high.    Assessment/Plan   Problem List Items Addressed This Visit             ICD-10-CM    Type 2 diabetes mellitus with hyperglycemia, without long-term current use of insulin (Multi) E11.65    Relevant Medications    metFORMIN (Glucophage) 1,000 mg tablet     Other Visit Diagnoses         Codes    Hospital discharge follow-up    -  Primary Z09    Difficulty in walking involving ankle and foot joint     R26.2    Relevant Orders    [employer]    Chronic low back pain, unspecified back pain laterality, unspecified whether sciatica present     M54.50, G89.29    Relevant Medications    cyclobenzaprine (Flexeril) 10 mg tablet          Follow up with Neuro on September 24th.    Continue with PT, OT, SLP as advised.    Bloodwork to complete before PCP appointment in November.    Flexeril 10mg PRN at bedtime for chronic back pain. May need Pain management referral if symptoms worsen/not alleviated.     A1c was 9.2% on August 5th. Increase metformin to 1000mg BID. Continue with Farxiga 5mg daily.    Continue to monitor sugars closely, follow diabetic diet and encouraged daily activity or exercise as able.    I also advised you to follow low fat diet and exercise for at least 30 minutes daily.    Anticipatory  guidance, age appropriate vaccines, screening exams, health promotion and prevention discussed.    This document was generated using the assistance of voice recognition software. If there are any errors of spelling, grammar, syntax, or meaning; please feel free to contact me directly for clarification.

## 2024-08-12 ENCOUNTER — HOME CARE VISIT (OUTPATIENT)
Dept: HOME HEALTH SERVICES | Facility: HOME HEALTH | Age: 49
End: 2024-08-12
Payer: COMMERCIAL

## 2024-08-12 VITALS
TEMPERATURE: 97.8 F | RESPIRATION RATE: 18 BRPM | DIASTOLIC BLOOD PRESSURE: 77 MMHG | HEART RATE: 77 BPM | SYSTOLIC BLOOD PRESSURE: 128 MMHG | OXYGEN SATURATION: 98 %

## 2024-08-12 PROCEDURE — G0157 HHC PT ASSISTANT EA 15: HCPCS | Mod: CQ

## 2024-08-12 PROCEDURE — G0158 HHC OT ASSISTANT EA 15: HCPCS | Mod: CO

## 2024-08-12 PROCEDURE — G0153 HHCP-SVS OF S/L PATH,EA 15MN: HCPCS

## 2024-08-12 SDOH — HEALTH STABILITY: PHYSICAL HEALTH: EXERCISE TYPE: B LE STRENGTHENING THER EX , ENDURANCE TRAINING

## 2024-08-12 SDOH — HEALTH STABILITY: PHYSICAL HEALTH
EXERCISE COMMENTS: 1 SET OF 15 EACH, BLUE THERABAND 2# ANKLE WEIGHT ON L LE  SUPINE THER EX: SLR, HIP ABD, HEEL SLIDES, SAQ, GLUT SETS 5 SEC HOLDS QUAD SETS 5 SEC HOLDS  SEATED THER EX: MARCHES, LEG EXT, LAQ, TBAND HIP ABD, BALL SQUEEZES HIP ADD, BLUE TBAND RESISTIVE D

## 2024-08-12 SDOH — HEALTH STABILITY: PHYSICAL HEALTH: EXERCISE COMMENTS: F/PF /INV/EVR

## 2024-08-12 ASSESSMENT — ENCOUNTER SYMPTOMS
PERSON REPORTING PAIN: PATIENT
PAIN LOCATION - PAIN QUALITY: ACHING
SUBJECTIVE PAIN PROGRESSION: UNCHANGED
PAIN SEVERITY GOAL: 0/10
PAIN LOCATION - PAIN SEVERITY: 5/10
PAIN LOCATION: RIGHT LEG
PAIN: 1
PAIN LOCATION - PAIN FREQUENCY: INTERMITTENT
DENIES PAIN: 1
OCCASIONAL FEELINGS OF UNSTEADINESS: 1
LOWEST PAIN SEVERITY IN PAST 24 HOURS: 3/10
PAIN LOCATION - RELIEVING FACTORS: TIME OF DAY, POSTIONING
PERSON REPORTING PAIN: PATIENT
HIGHEST PAIN SEVERITY IN PAST 24 HOURS: 8/10

## 2024-08-12 ASSESSMENT — ACTIVITIES OF DAILY LIVING (ADL)
AMBULATION ASSISTANCE ON FLAT SURFACES: 1
BATHING EQUIPMENT USED: SHOWER CHAIR
AMBULATION ASSISTANCE: ONE PERSON
BATHING_CURRENT_FUNCTION: ONE PERSON
AMBULATION ASSISTANCE: 1
AMBULATION_DISTANCE/DURATION_TOLERATED: 45 FT
BATHING ASSESSED: 1

## 2024-08-13 DIAGNOSIS — G35 MULTIPLE SCLEROSIS, RELAPSING-REMITTING (MULTI): Primary | ICD-10-CM

## 2024-08-13 DIAGNOSIS — F31.32 BIPOLAR AFFECTIVE DISORDER, CURRENTLY DEPRESSED, MODERATE (MULTI): ICD-10-CM

## 2024-08-13 DIAGNOSIS — E11.65 TYPE 2 DIABETES MELLITUS WITH HYPERGLYCEMIA, WITHOUT LONG-TERM CURRENT USE OF INSULIN (MULTI): Primary | ICD-10-CM

## 2024-08-13 DIAGNOSIS — J45.909 ASTHMA, UNSPECIFIED ASTHMA SEVERITY, UNSPECIFIED WHETHER COMPLICATED, UNSPECIFIED WHETHER PERSISTENT (HHS-HCC): ICD-10-CM

## 2024-08-13 RX ORDER — DULOXETIN HYDROCHLORIDE 60 MG/1
60 CAPSULE, DELAYED RELEASE ORAL NIGHTLY
Qty: 90 CAPSULE | Refills: 3 | Status: SHIPPED | OUTPATIENT
Start: 2024-08-13 | End: 2025-08-13

## 2024-08-13 NOTE — CASE COMMUNICATION
Hello I just wanted to touch base on this patient. Patient has AFO however upon my first visit with her she was in alot of pain with AFO behind the foot and around ankle where putting pressure down on foot.  Pt. needs to be fitted for new AFO. If we could get an order for patient to be fitted for new one this would help patient with drop foot during ambulation to decrease risk of falls and be comfortable enough for patient to wear more  consistently.  Please let me know what next steps to take and who to contact to get this patient fitted for new AFO. Thank you.

## 2024-08-14 ENCOUNTER — HOME CARE VISIT (OUTPATIENT)
Dept: HOME HEALTH SERVICES | Facility: HOME HEALTH | Age: 49
End: 2024-08-14
Payer: COMMERCIAL

## 2024-08-14 VITALS
RESPIRATION RATE: 17 BRPM | DIASTOLIC BLOOD PRESSURE: 66 MMHG | SYSTOLIC BLOOD PRESSURE: 127 MMHG | HEART RATE: 99 BPM | TEMPERATURE: 98.1 F | OXYGEN SATURATION: 98 %

## 2024-08-14 PROCEDURE — G0158 HHC OT ASSISTANT EA 15: HCPCS | Mod: CO

## 2024-08-14 PROCEDURE — G0157 HHC PT ASSISTANT EA 15: HCPCS | Mod: CQ

## 2024-08-14 SDOH — HEALTH STABILITY: PHYSICAL HEALTH
EXERCISE COMMENTS: 1 SET OF 15 EACH, BLUE THERABAND  SEATED THER EX: MARCHES, LEG EXT, LAQ, TBAND HIP ABD, BALL SQUEEZES HIP ADD  SUPINE THER EX: SLR, HIP ABD, HEEL SLIDES, SAQ, GLUT SETS 5 SEC HOLDS QUAD SETS 5 SEC HOLDS  STANDING THER EX: MARCHES, HEEL/TOE RAISES,

## 2024-08-14 SDOH — HEALTH STABILITY: PHYSICAL HEALTH: EXERCISE TYPE: B LE STRENGTHENING THER EX, ENDURANCE TRAINING

## 2024-08-14 ASSESSMENT — ENCOUNTER SYMPTOMS
PAIN LOCATION - PAIN QUALITY: ACHING
HIGHEST PAIN SEVERITY IN PAST 24 HOURS: 6/10
OCCASIONAL FEELINGS OF UNSTEADINESS: 1
LOWEST PAIN SEVERITY IN PAST 24 HOURS: 4/10
PAIN LOCATION - EXACERBATING FACTORS: TIME OF DAY , POSITIONING
PAIN LOCATION: RIGHT LEG
PERSON REPORTING PAIN: PATIENT
PAIN SEVERITY GOAL: 0/10
LIMITED RANGE OF MOTION: 1
PAIN: 1
PAIN LOCATION - PAIN SEVERITY: 6/10
PERSON REPORTING PAIN: PATIENT
PAIN LOCATION - PAIN FREQUENCY: INTERMITTENT
SUBJECTIVE PAIN PROGRESSION: UNCHANGED
DENIES PAIN: 1
MUSCLE WEAKNESS: 1
PAIN LOCATION - RELIEVING FACTORS: TIME OF DAY, POSITIONING

## 2024-08-14 ASSESSMENT — ACTIVITIES OF DAILY LIVING (ADL)
AMBULATION ASSISTANCE: 1
AMBULATION_DISTANCE/DURATION_TOLERATED: 25 FT
BATHING_CURRENT_FUNCTION: STAND BY ASSIST
BATHING ASSESSED: 1
BATHING EQUIPMENT USED: SHOWER CHAIR
AMBULATION ASSISTANCE ON FLAT SURFACES: 1
AMBULATION ASSISTANCE: STAND BY ASSIST

## 2024-08-16 ENCOUNTER — TELEPHONE (OUTPATIENT)
Dept: PRIMARY CARE | Facility: CLINIC | Age: 49
End: 2024-08-16
Payer: COMMERCIAL

## 2024-08-16 DIAGNOSIS — M43.06 LUMBAR SPONDYLOLYSIS: ICD-10-CM

## 2024-08-16 DIAGNOSIS — G35 MULTIPLE SCLEROSIS, RELAPSING-REMITTING (MULTI): ICD-10-CM

## 2024-08-16 DIAGNOSIS — M21.371 FOOT DROP, RIGHT: Primary | ICD-10-CM

## 2024-08-16 NOTE — TELEPHONE ENCOUNTER
Patient called to see if pcp was going to right the prescription for brace for right foot drag foot please advise       [facility]     Phone: [phone]

## 2024-08-19 ENCOUNTER — HOME CARE VISIT (OUTPATIENT)
Dept: HOME HEALTH SERVICES | Facility: HOME HEALTH | Age: 49
End: 2024-08-19
Payer: COMMERCIAL

## 2024-08-19 VITALS — HEART RATE: 98 BPM | OXYGEN SATURATION: 97 %

## 2024-08-19 PROCEDURE — G0157 HHC PT ASSISTANT EA 15: HCPCS | Mod: CQ

## 2024-08-19 PROCEDURE — G0158 HHC OT ASSISTANT EA 15: HCPCS | Mod: CO

## 2024-08-19 PROCEDURE — G0153 HHCP-SVS OF S/L PATH,EA 15MN: HCPCS

## 2024-08-19 SDOH — HEALTH STABILITY: PHYSICAL HEALTH
EXERCISE COMMENTS: 1 SET OF 15 EACH ,BLUE THERABAND  SEATED THER EX: MARCHES, LEG EXT, LAQ, TBAND HIP ABD, BALL SQUEEZES HIP ADD  SUPINE THER EX: SLR, HIP ABD, HEEL SLIDES, SAQ, GLUT SETS 5 SEC HOLDS QUAD SETS 5 SEC HOLDS  STANDING THER EX: MARCHES, HEEL/TOE RAISES, HI

## 2024-08-19 SDOH — HEALTH STABILITY: PHYSICAL HEALTH: EXERCISE COMMENTS: P ABD, HIP FLEX/EXT, MINI SQUATS , HAM CURLS

## 2024-08-19 SDOH — HEALTH STABILITY: PHYSICAL HEALTH: EXERCISE TYPE: B LE STRENGTHENING THER EX, ENDURANCE TRAINING

## 2024-08-19 ASSESSMENT — ACTIVITIES OF DAILY LIVING (ADL)
BATHING_CURRENT_FUNCTION: STAND BY ASSIST
AMBULATION ASSISTANCE ON FLAT SURFACES: 1
AMBULATION ASSISTANCE: STAND BY ASSIST
AMBULATION ASSISTANCE: 1
AMBULATION_DISTANCE/DURATION_TOLERATED: 50 FT
BATHING ASSESSED: 1
BATHING EQUIPMENT USED: SHOWER CHAIR

## 2024-08-19 ASSESSMENT — ENCOUNTER SYMPTOMS
MUSCLE WEAKNESS: 1
PAIN LOCATION - PAIN SEVERITY: 6/10
PAIN LOCATION: RIGHT LEG
HIGHEST PAIN SEVERITY IN PAST 24 HOURS: 6/10
DENIES PAIN: 1
PERSON REPORTING PAIN: PATIENT
PAIN LOCATION - PAIN QUALITY: ACHING
PAIN LOCATION - RELIEVING FACTORS: TIME OF DAY
PAIN LOCATION - PAIN FREQUENCY: INTERMITTENT
PAIN LOCATION - EXACERBATING FACTORS: TIME OF DAY
SUBJECTIVE PAIN PROGRESSION: GRADUALLY IMPROVING
OCCASIONAL FEELINGS OF UNSTEADINESS: 1
LOWEST PAIN SEVERITY IN PAST 24 HOURS: 4/10
PAIN: 1
PERSON REPORTING PAIN: PATIENT
PAIN SEVERITY GOAL: 0/10

## 2024-08-21 ENCOUNTER — HOME CARE VISIT (OUTPATIENT)
Dept: HOME HEALTH SERVICES | Facility: HOME HEALTH | Age: 49
End: 2024-08-21
Payer: COMMERCIAL

## 2024-08-21 ENCOUNTER — PATIENT OUTREACH (OUTPATIENT)
Dept: CARE COORDINATION | Facility: CLINIC | Age: 49
End: 2024-08-21
Payer: COMMERCIAL

## 2024-08-21 VITALS
HEART RATE: 99 BPM | TEMPERATURE: 97.8 F | OXYGEN SATURATION: 97 % | DIASTOLIC BLOOD PRESSURE: 65 MMHG | SYSTOLIC BLOOD PRESSURE: 127 MMHG | RESPIRATION RATE: 17 BRPM

## 2024-08-21 PROCEDURE — G0157 HHC PT ASSISTANT EA 15: HCPCS | Mod: CQ

## 2024-08-21 PROCEDURE — G0158 HHC OT ASSISTANT EA 15: HCPCS | Mod: CO

## 2024-08-21 SDOH — HEALTH STABILITY: PHYSICAL HEALTH: EXERCISE COMMENTS: HEEL/TOE RAISES, HIP ABD, HIP FLEX/EXT, MINI SQUATS , HAM CURLS

## 2024-08-21 SDOH — HEALTH STABILITY: PHYSICAL HEALTH
EXERCISE COMMENTS: 2 SETS FO 10 EACH , BLUE THERABAND, 2# ANKLE WEIGHTS  SEATED THER EX: MARCHES, LEG EXT, LAQ, TBAND HIP ABD, BALL SQUEEZES HIP ADD  SUPINE THER EX: SLR, HIP ABD, HEEL SLIDES, SAQ, GLUT SETS 5 SEC HOLDS QUAD SETS 5 SEC HOLDS  STANDING THER EX: MARCHES,

## 2024-08-21 SDOH — HEALTH STABILITY: PHYSICAL HEALTH: EXERCISE TYPE: B LE STRENGTHENING THER EX, ENDURANCE TRAINING

## 2024-08-21 ASSESSMENT — ENCOUNTER SYMPTOMS
PERSON REPORTING PAIN: PATIENT
LIMITED RANGE OF MOTION: 1
PAIN: 1
PAIN LOCATION - EXACERBATING FACTORS: TIME OF DAY, POSITIONING
PAIN SEVERITY GOAL: 0/10
PAIN LOCATION - RELIEVING FACTORS: TIME OF DAY, POSITIONING
PAIN LOCATION - PAIN FREQUENCY: INTERMITTENT
LOWEST PAIN SEVERITY IN PAST 24 HOURS: 2/10
OCCASIONAL FEELINGS OF UNSTEADINESS: 1
PAIN LOCATION - PAIN SEVERITY: 3/10
SUBJECTIVE PAIN PROGRESSION: UNCHANGED
PAIN LOCATION - PAIN QUALITY: ACHING
PAIN LOCATION: RIGHT LEG
MUSCLE WEAKNESS: 1
HIGHEST PAIN SEVERITY IN PAST 24 HOURS: 4/10

## 2024-08-21 ASSESSMENT — ACTIVITIES OF DAILY LIVING (ADL)
AMBULATION ASSISTANCE: STAND BY ASSIST
BATHING_CURRENT_FUNCTION: STAND BY ASSIST
AMBULATION_DISTANCE/DURATION_TOLERATED: 55 FT
AMBULATION ASSISTANCE ON FLAT SURFACES: 1
AMBULATION ASSISTANCE: 1
BATHING EQUIPMENT USED: SHOWER CHAIR
BATHING ASSESSED: 1

## 2024-08-21 NOTE — PROGRESS NOTES
Call regarding appt. with PCP on (8-9-24) after hospitalization.  At time of outreach call the patient feels as if their condition has improved since last visit.  Reviewed the PCP appointment with the pt and addressed any questions or concerns.    Pt. States no questions/concerns at this time.     Sophie Richter LPN

## 2024-08-22 ASSESSMENT — ENCOUNTER SYMPTOMS
PAIN LOCATION - PAIN SEVERITY: 7/10
PAIN LOCATION: BACK
PAIN: 1
PAIN LOCATION - PAIN QUALITY: ACHE
PERSON REPORTING PAIN: PATIENT

## 2024-08-26 ENCOUNTER — HOME CARE VISIT (OUTPATIENT)
Dept: HOME HEALTH SERVICES | Facility: HOME HEALTH | Age: 49
End: 2024-08-26
Payer: COMMERCIAL

## 2024-08-26 VITALS
SYSTOLIC BLOOD PRESSURE: 124 MMHG | RESPIRATION RATE: 18 BRPM | HEART RATE: 96 BPM | DIASTOLIC BLOOD PRESSURE: 85 MMHG | OXYGEN SATURATION: 97 %

## 2024-08-26 VITALS
HEART RATE: 95 BPM | RESPIRATION RATE: 17 BRPM | TEMPERATURE: 97.8 F | OXYGEN SATURATION: 98 % | DIASTOLIC BLOOD PRESSURE: 82 MMHG | SYSTOLIC BLOOD PRESSURE: 122 MMHG

## 2024-08-26 DIAGNOSIS — G35 MULTIPLE SCLEROSIS, RELAPSING-REMITTING (MULTI): ICD-10-CM

## 2024-08-26 PROCEDURE — G0158 HHC OT ASSISTANT EA 15: HCPCS | Mod: CO

## 2024-08-26 PROCEDURE — G0153 HHCP-SVS OF S/L PATH,EA 15MN: HCPCS

## 2024-08-26 PROCEDURE — G0157 HHC PT ASSISTANT EA 15: HCPCS | Mod: CQ

## 2024-08-26 SDOH — HEALTH STABILITY: PHYSICAL HEALTH
EXERCISE COMMENTS: 1 SET OF 15 EACH, BLUE THERABAND, 2# ANKLE WEIGHTS  SEATED THER EX: MARCHES, LEG EXT, LAQ, TBAND HIP ABD, BALL SQUEEZES HIP ADD  SUPINE THER EX: SLR, HIP ABD, HEEL SLIDES, SAQ, GLUT SETS 5 SEC HOLDS QUAD SETS 5 SEC HOLDS  STANDING THER EX: MARCHES, H

## 2024-08-26 SDOH — HEALTH STABILITY: PHYSICAL HEALTH: EXERCISE TYPE: B LE STRENGTHENING THER EX, ENDURANCE TRAINING

## 2024-08-26 SDOH — HEALTH STABILITY: PHYSICAL HEALTH: EXERCISE COMMENTS: EEL/TOE RAISES, HIP ABD, HIP FLEX/EXT, MINI SQUATS , HAM CURLS

## 2024-08-26 ASSESSMENT — ACTIVITIES OF DAILY LIVING (ADL)
BATHING_CURRENT_FUNCTION: STAND BY ASSIST
AMBULATION_DISTANCE/DURATION_TOLERATED: 65 FT
BATHING ASSESSED: 1
AMBULATION ASSISTANCE: STAND BY ASSIST
AMBULATION ASSISTANCE ON FLAT SURFACES: 1
BATHING EQUIPMENT USED: SHOWER CHAIR
AMBULATION ASSISTANCE: 1

## 2024-08-26 ASSESSMENT — ENCOUNTER SYMPTOMS
LIMITED RANGE OF MOTION: 1
OCCASIONAL FEELINGS OF UNSTEADINESS: 1
DENIES PAIN: 1
DENIES PAIN: 1
MUSCLE WEAKNESS: 1
PERSON REPORTING PAIN: PATIENT
PERSON REPORTING PAIN: PATIENT

## 2024-08-26 NOTE — CASE COMMUNICATION
DISCHARGE SUMMARY:    DISCIPLINE: Speech Language Pathology  DATE OF DISCIPLINE DISCHARGE: 8/26/24  REASON FOR DISCHARGE: GOALS MET  COORDINATION NOTE: Pt seen for skilled ST dc. Pt presents with continued stutter. Stutter has decreased and intensity. Pt functinal with fluency strategies and HEP.  EVALUATION OF GOALS: met  SUMMARY OF CARE PROVIDED: fluency strategies, HEP established  DISCHARGE INSTRUCTIONS GIVEN: HEP established  SERVI RADHA REMAINING: OT, PT  NOMNC OBTAINED: n/a

## 2024-08-27 RX ORDER — TERIFLUNOMIDE 14 MG/1
TABLET, FILM COATED ORAL DAILY
Qty: 30 TABLET | Refills: 0 | Status: SHIPPED | OUTPATIENT
Start: 2024-08-27

## 2024-08-30 ENCOUNTER — HOME CARE VISIT (OUTPATIENT)
Dept: HOME HEALTH SERVICES | Facility: HOME HEALTH | Age: 49
End: 2024-08-30
Payer: COMMERCIAL

## 2024-08-30 VITALS
HEART RATE: 77 BPM | DIASTOLIC BLOOD PRESSURE: 82 MMHG | TEMPERATURE: 98 F | RESPIRATION RATE: 17 BRPM | OXYGEN SATURATION: 98 % | SYSTOLIC BLOOD PRESSURE: 122 MMHG

## 2024-08-30 PROCEDURE — G0157 HHC PT ASSISTANT EA 15: HCPCS | Mod: CQ

## 2024-08-30 SDOH — HEALTH STABILITY: PHYSICAL HEALTH
EXERCISE COMMENTS: 1 SET OF 15 EACH, BLUE THERABAND, 2# ANKLE WEIGHTS   SEATED THER EX: MARCHES, LEG EXT, LAQ, TBAND HIP ABD, BALL SQUEEZES HIP ADD  SUPINE THER EX: SLR, HIP ABD, HEEL SLIDES, SAQ, GLUT SETS 5 SEC HOLDS QUAD SETS 5 SEC HOLDS  STANDING THER EX: MARCHES,

## 2024-08-30 SDOH — HEALTH STABILITY: PHYSICAL HEALTH: EXERCISE COMMENTS: HEEL/TOE RAISES, HIP ABD, HIP FLEX/EXT, MINI SQUATS , HAM CURLS

## 2024-08-30 SDOH — HEALTH STABILITY: PHYSICAL HEALTH: EXERCISE TYPE: B LE STRENGTHENING THER EX, ENDURANCE TRAINING

## 2024-08-30 ASSESSMENT — ENCOUNTER SYMPTOMS
PERSON REPORTING PAIN: PATIENT
MUSCLE WEAKNESS: 1
PAIN LOCATION - RELIEVING FACTORS: TIME OF DAY
PAIN LOCATION - PAIN SEVERITY: 6/10
PAIN: 1
PAIN LOCATION - EXACERBATING FACTORS: TIME OF DAY
PAIN LOCATION: BACK
ARTHRALGIAS: 1
HIGHEST PAIN SEVERITY IN PAST 24 HOURS: 8/10
SUBJECTIVE PAIN PROGRESSION: UNCHANGED
OCCASIONAL FEELINGS OF UNSTEADINESS: 1
PAIN LOCATION - PAIN QUALITY: ACHING
LOWEST PAIN SEVERITY IN PAST 24 HOURS: 5/10
PAIN LOCATION - PAIN FREQUENCY: INTERMITTENT
PAIN SEVERITY GOAL: 0/10

## 2024-08-30 ASSESSMENT — ACTIVITIES OF DAILY LIVING (ADL)
BATHING ASSESSED: 1
BATHING_CURRENT_FUNCTION: STAND BY ASSIST
AMBULATION_DISTANCE/DURATION_TOLERATED: 75 FT
AMBULATION ASSISTANCE ON FLAT SURFACES: 1
BATHING EQUIPMENT USED: SHOWER CHAIR
AMBULATION ASSISTANCE: STAND BY ASSIST
AMBULATION ASSISTANCE: 1

## 2024-08-31 ENCOUNTER — HOME CARE VISIT (OUTPATIENT)
Dept: HOME HEALTH SERVICES | Facility: HOME HEALTH | Age: 49
End: 2024-08-31
Payer: COMMERCIAL

## 2024-08-31 PROCEDURE — G0158 HHC OT ASSISTANT EA 15: HCPCS | Mod: CO

## 2024-09-01 ASSESSMENT — ENCOUNTER SYMPTOMS
PAIN LOCATION - PAIN SEVERITY: 6/10
PAIN: 1
PERSON REPORTING PAIN: PATIENT
PAIN LOCATION: BACK

## 2024-09-04 ENCOUNTER — HOME CARE VISIT (OUTPATIENT)
Dept: HOME HEALTH SERVICES | Facility: HOME HEALTH | Age: 49
End: 2024-09-04
Payer: COMMERCIAL

## 2024-09-04 VITALS
SYSTOLIC BLOOD PRESSURE: 126 MMHG | HEART RATE: 117 BPM | OXYGEN SATURATION: 98 % | DIASTOLIC BLOOD PRESSURE: 75 MMHG | TEMPERATURE: 97.9 F

## 2024-09-04 PROCEDURE — G0152 HHCP-SERV OF OT,EA 15 MIN: HCPCS

## 2024-09-04 ASSESSMENT — ENCOUNTER SYMPTOMS
PAIN LOCATION - RELIEVING FACTORS: REST
PAIN: 1
PAIN LOCATION - PAIN SEVERITY: 7/10
PAIN LOCATION - PAIN DURATION: CHRONIC
PAIN LOCATION: BACK
PAIN LOCATION - EXACERBATING FACTORS: ACTIVITY
PERSON REPORTING PAIN: PATIENT
PAIN LOCATION - RELIEVING FACTORS: REST
PAIN LOCATION - PAIN DURATION: ACUTE
PAIN LOCATION - PAIN QUALITY: ACHING
OCCASIONAL FEELINGS OF UNSTEADINESS: 1
PAIN LOCATION - PAIN QUALITY: ACHING
PAIN LOCATION - EXACERBATING FACTORS: WEARING BRACE
PAIN LOCATION - PAIN FREQUENCY: FREQUENT
PAIN LOCATION: RIGHT FOOT
PAIN LOCATION - PAIN SEVERITY: 8/10
PAIN LOCATION - PAIN FREQUENCY: CONSTANT

## 2024-09-04 ASSESSMENT — ACTIVITIES OF DAILY LIVING (ADL)
TOILETING: INDEPENDENT
ADLS_COMMENTS: AFETY.   15  THE PATIENT CAN SAFELY APPROACH THE BED WALKING OR IN A WHEELCHAIR, LOCK BRAKES, LIFT FOOTRESTS, OR POSITION WALKING AID, MOVE SAFELY TO BED, LIE DOWN, COME TO A SITTING POSITION ON THE SIDE OF THE BED, CHANGE THE POSITION OF THE WHEELCH
GROOMING_WITHIN_DEFINED_LIMITS: 1
DRESSING_UB_CURRENT_FUNCTION: INDEPENDENT
ADLS_COMMENTS: TRAY OR TABLE WHEN SOMEONE PUTS THE FOOD WITHIN REACH. THE PATIENT MUST PUT ON AN ASSISTIVE DEVICE IF NEEDED, CUT FOOD, AND IF DESIRED USE SALT AND PEPPER, SPREAD BUTTER, ETC.    SCORE  95/100    SCORE INTERPRETATION   TOTAL DEPENDENCE     00 - 20  S
ADLS_COMMENTS: AGEMENT.   0    DEPENDENT IN WHEELCHAIR AMBULATION.   1    PATIENT CAN PROPEL SELF SHORT DISTANCES ON FLAT SURFACE, BUT ASSISTANCE IS REQUIRED FOR ALL OTHER STEPS OF WHEELCHAIR MANAGEMENT.  3    PRESENCE OF ONE PERSON IS NECESSARY AND CONSTANT ASSIST
ADLS_COMMENTS: ANCE IS REQUIRED TO MANIPULATE CHAIR TO TABLE, BED, ETC.   4    THE PATIENT CAN PROPEL SELF FOR A REASONABLE DURATION OVER REGULARLY ENCOUNTERED TERRAIN. MINIMAL ASSISTANCE MAY STILL BE REQUIRED IN “TIGHT CORNERS” OR TO NEGOTIATE A KERB 100MM HIGH.
WASHING_UPB_CURRENT_FUNCTION: INDEPENDENT
BATHING ASSESSED: 1
ADLS_COMMENTS: POSITION, AND WITH BOWEL MOVEMENT FACILITATORY TECHNIQUES.   5   THE PATIENT CAN ASSUME APPROPRIATE POSITION, BUT CANNOT USE FACILITATORY TECHNIQUES OR CLEAN SELF WITHOUT ASSISTANCE AND HAS FREQUENT ACCIDENTS.   8    ASSISTANCE IS REQUIRED WITH INCON
ADLS_COMMENTS: ISION AND ASSISTANCE.   8    GENERALLY NO ASSISTANCE IS REQUIRED. AT TIMES SUP IS REQUIRED FOR SAFETY DUE TO MORNING STIFFNESS, SOB, ETC  10   THE PATIENT IS ABLE TO GO UP/DOWN A FLIGHT OF STAIRS SAFELY WITHOUT HELP OR SUPERVISION,USE HAND RAILS, CAN
ADLS_COMMENTS: INIMAL ASSISTANCE IS REQUIRED WITH FASTENING CLOTHING SUCH AS BUTTONS, ZIPS, BRA, SHOES, ETC.   10   THE PATIENT IS ABLE TO PUT ON, REMOVE, CORSET, BRACES, AS PRESCRIBED.     PERSONAL HYGIENE (GROOMING)   0    THE PATIENT IS UNABLE TO ATTEND TO PERSO
ADLS_COMMENTS: ET PAPER WITHOUT HELP. IF NECESSARY, THE PATIENT MAY USE A BED PAN OR COMMODE OR URINAL AT NIGHT, BUT MUST BE ABLE TO EMPTY IT AND CLEAN IT.     BOWEL CONTROL   0   THE PATIENT IS BOWEL INCONTINENT.   2   THE PATIENT NEEDS HELP TO ASSUME APPROPRIATE
FEEDING_WITHIN_DEFINED_LIMITS: 1
DRESSING_LB_CURRENT_FUNCTION: INDEPENDENT
ADLS_COMMENTS: EVERE DEPENDENCE     21 - 60  MODERATE DEPENDENCE     61 - 90  **SLIGHT DEPENDENCE      91 - 99  INDEPENDENCE        100    SCORE PREDICTION    LESS THAN 40    UNLIKELY TO GO HOME - DEPENDENT IN MOBILITY - DEPENDENT IN SELF CARE   60    PIVOTAL SCORE
ADLS_COMMENTS: AIR, TRANSFER BACK INTO IT SAFELY AND/OR GRASP AID AND STAND. THE PATIENT MUST BE INDEPENDENT IN ALL PHASES OF THIS ACTIVITY.     AMBULATION    0    DEPENDENT IN AMBULATION  3    CONSTANT PRESENCE OF ONE OR MORE ASSISTANT IS REQUIRED DURING AMBULATIO
ADLS_COMMENTS: NAL HYGIENE AND IS DEPENDENT IN ALL ASPECTS.   1    ASSISTANCE IS REQUIRED IN ALL STEPS OF PERSONAL HYGIENE, BUT PATIENT ABLE TO MAKE SOME CONTRIBUTION.   3    SOME ASSISTANCE IS REQUIRED IN ONE OR MORE STEPS OF PERSONAL HYGIENE.   4    PATIENT IS AB
ADLS_COMMENTS: RIC NEEDS TO BE ADMINISTERED.   2    CAN MANIPULATE AN EATING DEVICE, USUALLY A SPOON, BUT SOMEONE MUST PROVIDE ACTIVE ASSISTANCE DURING THE MEAL.   5    ABLE TO FEED SELF WITH SUPERVISION. ASSISTANCE IS REQUIRED WITH ASSOCIATED TASKS SUCH AS PUTTING
BATHING EQUIPMENT USED: SHOWER CHAIR
TOILETING: 1
ADLS_COMMENTS: NCE OR SAFETY IN HAZARDOUS SITUATIONS.   15   THE PATIENT MUST BE ABLE TO WEAR BRACES IF REQUIRED, LOCK AND UNLOCK THESE BRACES ASSUME STANDING POSITION, SIT DOWN, AND PLACE THE NECESSARY AIDS INTO POSITION FOR USE. THE PATIENT MUST BE ABLE TO CRUTCH
ADLS_COMMENTS: EMENT OF CLOTHING, TRANSFERRING, OR WASHING HANDS.   8    SUP MAY BE REQUIRED FOR SAFETY WITH NORMAL TOILET. BSC MAY BE USED AT NIGHT, ASSIST FOR EMPTYING AND CLEANING.   10   THE PATIENT IS ABLE TO GET ON/OFF THE TOILET, FASTEN CLOTHING AND USE TOIL
ADLS_COMMENTS: E TO WALK OR USE WHEELCHAIR INDEPENDENTLY.
ADLS_COMMENTS: LE TO CONDUCT OWN PERSONAL HYGIENE BUT REQUIRES MIN ASSIST BEFORE AND/OR AFTER THE OPERATION.   5    THE PATIENT CAN WASH HIS/HER HANDS AND FACE, COMB HAIR, CLEAN TEETH AND SHAVE. A MALE PATIENT MAY USE ANY KIND OF RAZOR BUT MUST INSERT THE BLADE, OR
ADLS_COMMENTS: 5    TO PROPEL WHEELCHAIR INDEPENDENTLY, THE PATIENT MUST BE ABLE TO GO AROUND CORNERS, TURN AROUND, MANOEUVRE THE CHAIR TO A TABLE, BED, TOILET, ETC. THE PATIENT MUST BE ABLE TO PUSH A CHAIR AT LEAST 50 METRES AND NEGOTIATE KERB.       STAIR CLIMBI
ADLS_COMMENTS: BLADDER CONTROL   0    THE PATIENT IS DEPENDENT IN BLADDER MANAGEMENT, IS INCONTINENT, OR HAS INDWELLING CATHETER.   2    THE PATIENT IS INCONTINENT BUT IS ABLE TO ASSIST WITH THE APPLICATION OF AN INTERNAL OR EXTERNAL DEVICE.   5    THE PATIENT IS
ADLS_COMMENTS: TINENCE AIDS SUCH AS PAD, ETC. THE PATIENT MAY REQUIRE SUPERVISION WITH THE USE OF SUPPOSITORY OR ENEMA AND HAS OCCASIONAL ACCIDENTS.   10   THE PATIENT CAN CONTROL BOWELS AND HAS NO ACCIDENTS, CAN USE SUPPOSITORY, OR TAKE AN ENEMA WHEN NECESSARY.
WASHING_LB_CURRENT_FUNCTION: INDEPENDENT
ADLS_COMMENTS: UIRED WITH EITHER TRANSFER TO SHOWER/BATH OR WITH WASHING OR DRYING; INCLUDING INABILITY TO COMPLETE A TASK BECAUSE OF CONDITION OR DISEASE, ETC.   4    SUPERVISION IS REQUIRED FOR SAFETY IN ADJUSTING THE WATER TEMPERATURE, OR IN THE TRANSFER.   5
ADLS_COMMENTS: AND NIGHT, AND/OR IS INDEPENDENT WITH INTERNAL OR EXTERNAL DEVICES.     BATHING   0    TOTAL DEPENDENCE IN BATHING SELF.   1    ASSISTANCE IS REQUIRED IN ALL ASPECTS OF BATHING, BUT PATIENT IS ABLE TO MAKE SOME CONTRIBUTION.   3    ASSISTANCE IS REQ
ADLS_COMMENTS: GENERALLY DRY BY DAY, BUT NOT AT NIGHT AND NEEDS SOME ASSISTANCE WITH THE DEVICES.   8    GENERALLY DRY BY DAY AND NIGHT, MAY HAVE OCCAS ACCIDENT OR NEED MIN ASSIST WITH INTERNAL OR EXTERNAL DEVICES.   10   THE PATIENT IS ABLE TO CONTROL BLADDER DAY
ADLS_COMMENTS: N.   8    ASSISTANCE IS REQUIRED WITH REACHING AIDS AND/OR THEIR MANIPULATION. ONE PERSON IS REQUIRED TO OFFER ASSISTANCE.   12   THE PATIENT IS INDEPENDENT IN AMBULATION BUT UNABLE TO WALK 50 METRES WITHOUT HELP, OR SUPERVISION IS NEEDED FOR CONFIDE
ADLS_COMMENTS: MILK/SUGAR INTO TEA, SALT, PEPPER, SPREADING BUTTER, TURNING A PLATE OR OTHER “SET UP” ACTIVITIES.   8    INDEP WITH PREPARED TRAY, MAY NEED MEAT CUT, MILK CARTON/JAR LID OPENED. ANOTHER PERSON IS NOT REQUIRED  10   THE PATIENT CAN FEED SELF FROM A
ADLS_COMMENTS: WHERE PATIENTS MOVE FROM DEPENDENCY TO ASSISTED INDEPENDENCE.   60 - 80    IF LIVING ALONE WILL PROBABLY NEED A NUMBER OF COMMUNITY SERVICES TO COPE.   **MORE THAN 85     LIKELY TO BE DISCHARGED TO COMMUNITY LIVING - INDEPENDENT IN TRANSFERS AND ABL
ADLS_COMMENTS: THE TRANSFER.   8    THE TRANSFER REQUIRES THE ASSISTANCE OF ONE OTHER PERSON. ASSISTANCE MAY BE REQUIRED IN ANY ASPECT OF THE TRANSFER.   12  THE PRESENCE OF ANOTHER PERSON IS REQUIRED EITHER AS A CONFIDENCE MEASURE, OR TO PROVIDE SUPERVISION FOR S
ADLS_COMMENTS: CHAIR/BED TRANSFERS  0    UNABLE TO PARTICIPATE IN A TRANSFER. TWO ATTENDANTS ARE REQUIRED TO TRANSFER THE PATIENT WITH OR WITHOUT A MECHANICAL DEVICE.   3    ABLE TO PARTICIPATE BUT MAXIMUM ASSISTANCE OF ONE OTHER PERSON IS REQUIRE IN ALL ASPECTS OF
BATHING_CURRENT_FUNCTION: INDEPENDENT
ADLS_COMMENTS: PLUG IN THE RAZOR WITHOUT HELP, AS WELL AS RETRIEVE IT FROM THE DRAWER OR CABINET. A FEMALE PATIENT MUST APPLY HER OWN MAKE-UP, IF USED, BUT NEED NOT BRAID OR STYLE HER HAIR.     FEEDING    0    DEPENDENT IN ALL ASPECTS AND NEEDS TO BE FED, NASOGAST
ADLS_COMMENTS: ES, CANES, OR A WALKARETTE, AND WALK 50 METRES WITHOUT HELP OR SUPERVISION.     AMBULATION/WHEELCHAIR * (IF UNABLE TO WALK) ONLY USE THIS ITEM IF THE PATIENT IS RATED “0” FOR AMBULATION, AND THEN ONLY IF THE PATIENT HAS BEEN TRAINED IN WHEELCHAIR MAN
ADLS_COMMENTS: NG   0    THE PATIENT IS UNABLE TO CLIMB STAIRS.   2    ASSISTANCE IS REQUIRED IN ALL ASPECTS OF CHAIR CLIMBING, INCLUDING ASSISTANCE WITH WALKING AIDS.   5    THE PATIENT IS ABLE TO ASCEND/DESCEND BUT IS UNABLE TO CARRY WALKING AIDS AND NEEDS SUPERV
WASHING_HAIR_CURRENT_FUNCTION: INDEPENDENT
ADLS_COMMENTS: OF DRESSING AND IS UNABLE TO PARTICIPATE IN THE ACTIVITY.   2     THE PATIENT IS ABLE TO PARTICIPATE TO SOME DEGREE, BUT IS DEPENDENT IN ALL ASPECTS OF DRESSING.   5     ASSISTANCE IS NEEDED IN PUTTING ON, AND/OR REMOVING ANY CLOTHING.   8     ONLY M
ADLS_COMMENTS: THE PATIENT MAY USE A BATHTUB, A SHOWER, OR TAKE A COMPLETE SPONGE BATH. THE PATIENT MUST BE ABLE TO DO ALL THE STEPS OF WHICHEVER METHOD IS EMPLOYED WITHOUT ANOTHER PERSON BEING PRESENT.     DRESSING   0     THE PATIENT IS DEPENDENT IN ALL ASPECTS

## 2024-09-05 ENCOUNTER — HOME CARE VISIT (OUTPATIENT)
Dept: HOME HEALTH SERVICES | Facility: HOME HEALTH | Age: 49
End: 2024-09-05
Payer: COMMERCIAL

## 2024-09-05 VITALS — OXYGEN SATURATION: 99 % | HEART RATE: 74 BPM | RESPIRATION RATE: 18 BRPM

## 2024-09-05 PROCEDURE — G0151 HHCP-SERV OF PT,EA 15 MIN: HCPCS

## 2024-09-05 ASSESSMENT — ENCOUNTER SYMPTOMS
PERSON REPORTING PAIN: PATIENT
DENIES PAIN: 1
MUSCLE WEAKNESS: 1

## 2024-09-05 ASSESSMENT — ACTIVITIES OF DAILY LIVING (ADL)
AMBULATION ASSISTANCE: STAND BY ASSIST
AMBULATION ASSISTANCE ON FLAT SURFACES: 1
CURRENT_FUNCTION: STAND BY ASSIST
AMBULATION_DISTANCE/DURATION_TOLERATED: 50FT

## 2024-09-06 ENCOUNTER — HOME CARE VISIT (OUTPATIENT)
Dept: HOME HEALTH SERVICES | Facility: HOME HEALTH | Age: 49
End: 2024-09-06
Payer: COMMERCIAL

## 2024-09-09 ENCOUNTER — PATIENT OUTREACH (OUTPATIENT)
Dept: PRIMARY CARE | Facility: CLINIC | Age: 49
End: 2024-09-09
Payer: COMMERCIAL

## 2024-09-11 ENCOUNTER — APPOINTMENT (OUTPATIENT)
Dept: RADIOLOGY | Facility: HOSPITAL | Age: 49
End: 2024-09-11
Payer: COMMERCIAL

## 2024-09-13 ENCOUNTER — APPOINTMENT (OUTPATIENT)
Dept: RADIOLOGY | Facility: HOSPITAL | Age: 49
End: 2024-09-13
Payer: COMMERCIAL

## 2024-09-19 DIAGNOSIS — G35 MULTIPLE SCLEROSIS, RELAPSING-REMITTING (MULTI): ICD-10-CM

## 2024-09-19 RX ORDER — TERIFLUNOMIDE 14 MG/1
TABLET, FILM COATED ORAL DAILY
Qty: 30 TABLET | Refills: 0 | Status: SHIPPED | OUTPATIENT
Start: 2024-09-19

## 2024-09-20 ENCOUNTER — HOME CARE VISIT (OUTPATIENT)
Dept: HOME HEALTH SERVICES | Facility: HOME HEALTH | Age: 49
End: 2024-09-20
Payer: COMMERCIAL

## 2024-09-20 ASSESSMENT — ACTIVITIES OF DAILY LIVING (ADL)
HOME_HEALTH_OASIS: 00
OASIS_M1830: 01

## 2024-09-20 NOTE — Clinical Note
Pt requesting dc from homecare. Her new brace has not yet arrived. She is modif indep in her home, and knows her ex program.  PT can return after patient gets the new brace, if more therapy is needed.

## 2024-09-20 NOTE — HOME HEALTH
DISCHARGE SUMMARY:    DISCIPLINE: Physical Therapy  DATE OF DISCIPLINE DISCHARGE: 9/20/24  REASON FOR DISCHARGE: PER CLIENT REQUEST  Pt cancelled further PT visits until her new brace arrives.  Will reschedule then, if visits are needed, per her request.

## 2024-09-24 ENCOUNTER — OFFICE VISIT (OUTPATIENT)
Dept: NEUROLOGY | Facility: HOSPITAL | Age: 49
End: 2024-09-24
Payer: COMMERCIAL

## 2024-09-24 ENCOUNTER — LAB (OUTPATIENT)
Dept: LAB | Facility: LAB | Age: 49
End: 2024-09-24
Payer: COMMERCIAL

## 2024-09-24 VITALS
WEIGHT: 222.6 LBS | RESPIRATION RATE: 18 BRPM | SYSTOLIC BLOOD PRESSURE: 137 MMHG | DIASTOLIC BLOOD PRESSURE: 69 MMHG | BODY MASS INDEX: 32.87 KG/M2 | HEART RATE: 84 BPM

## 2024-09-24 DIAGNOSIS — F31.32 BIPOLAR AFFECTIVE DISORDER, CURRENTLY DEPRESSED, MODERATE (MULTI): ICD-10-CM

## 2024-09-24 DIAGNOSIS — Z59.41 FOOD INSECURITY: ICD-10-CM

## 2024-09-24 DIAGNOSIS — E53.8 B12 DEFICIENCY: Primary | ICD-10-CM

## 2024-09-24 DIAGNOSIS — G43.709 CHRONIC MIGRAINE WITHOUT AURA WITHOUT STATUS MIGRAINOSUS, NOT INTRACTABLE: ICD-10-CM

## 2024-09-24 DIAGNOSIS — F80.81 STUTTERING: ICD-10-CM

## 2024-09-24 DIAGNOSIS — M21.371 RIGHT FOOT DROP: ICD-10-CM

## 2024-09-24 DIAGNOSIS — E53.8 B12 DEFICIENCY: ICD-10-CM

## 2024-09-24 DIAGNOSIS — E78.5 BORDERLINE HYPERLIPIDEMIA: ICD-10-CM

## 2024-09-24 DIAGNOSIS — G35 MULTIPLE SCLEROSIS, RELAPSING-REMITTING (MULTI): ICD-10-CM

## 2024-09-24 DIAGNOSIS — G35 MULTIPLE SCLEROSIS, RELAPSING-REMITTING (MULTI): Primary | ICD-10-CM

## 2024-09-24 DIAGNOSIS — E11.65 TYPE 2 DIABETES MELLITUS WITH HYPERGLYCEMIA, WITHOUT LONG-TERM CURRENT USE OF INSULIN: ICD-10-CM

## 2024-09-24 DIAGNOSIS — G40.909 NONINTRACTABLE EPILEPSY WITHOUT STATUS EPILEPTICUS, UNSPECIFIED EPILEPSY TYPE (MULTI): ICD-10-CM

## 2024-09-24 DIAGNOSIS — G81.91 RIGHT HEMIPARESIS (MULTI): ICD-10-CM

## 2024-09-24 DIAGNOSIS — Z86.39 H/O HYPERGLYCEMIA: ICD-10-CM

## 2024-09-24 LAB
ALBUMIN SERPL BCP-MCNC: 4.1 G/DL (ref 3.4–5)
ALP SERPL-CCNC: 143 U/L (ref 33–110)
ALT SERPL W P-5'-P-CCNC: 32 U/L (ref 7–45)
ANION GAP SERPL CALC-SCNC: 13 MMOL/L (ref 10–20)
AST SERPL W P-5'-P-CCNC: 23 U/L (ref 9–39)
BASOPHILS # BLD AUTO: 0.04 X10*3/UL (ref 0–0.1)
BASOPHILS NFR BLD AUTO: 0.3 %
BILIRUB SERPL-MCNC: 0.6 MG/DL (ref 0–1.2)
BUN SERPL-MCNC: 10 MG/DL (ref 6–23)
CALCIUM SERPL-MCNC: 8.9 MG/DL (ref 8.6–10.3)
CHLORIDE SERPL-SCNC: 99 MMOL/L (ref 98–107)
CHOLEST SERPL-MCNC: 294 MG/DL (ref 0–199)
CHOLESTEROL/HDL RATIO: 5.8
CO2 SERPL-SCNC: 30 MMOL/L (ref 21–32)
CREAT SERPL-MCNC: 0.6 MG/DL (ref 0.5–1.05)
EGFRCR SERPLBLD CKD-EPI 2021: >90 ML/MIN/1.73M*2
EOSINOPHIL # BLD AUTO: 0.42 X10*3/UL (ref 0–0.7)
EOSINOPHIL NFR BLD AUTO: 3.4 %
ERYTHROCYTE [DISTWIDTH] IN BLOOD BY AUTOMATED COUNT: 12.5 % (ref 11.5–14.5)
GLUCOSE SERPL-MCNC: 242 MG/DL (ref 74–99)
HCT VFR BLD AUTO: 47.1 % (ref 36–46)
HDLC SERPL-MCNC: 50.4 MG/DL
HGB BLD-MCNC: 15.5 G/DL (ref 12–16)
IMM GRANULOCYTES # BLD AUTO: 0.06 X10*3/UL (ref 0–0.7)
IMM GRANULOCYTES NFR BLD AUTO: 0.5 % (ref 0–0.9)
LDLC SERPL CALC-MCNC: 188 MG/DL
LYMPHOCYTES # BLD AUTO: 2.09 X10*3/UL (ref 1.2–4.8)
LYMPHOCYTES NFR BLD AUTO: 17 %
MCH RBC QN AUTO: 30.1 PG (ref 26–34)
MCHC RBC AUTO-ENTMCNC: 32.9 G/DL (ref 32–36)
MCV RBC AUTO: 92 FL (ref 80–100)
MONOCYTES # BLD AUTO: 0.52 X10*3/UL (ref 0.1–1)
MONOCYTES NFR BLD AUTO: 4.2 %
NEUTROPHILS # BLD AUTO: 9.15 X10*3/UL (ref 1.2–7.7)
NEUTROPHILS NFR BLD AUTO: 74.6 %
NON HDL CHOLESTEROL: 244 MG/DL (ref 0–149)
NRBC BLD-RTO: 0 /100 WBCS (ref 0–0)
PLATELET # BLD AUTO: 257 X10*3/UL (ref 150–450)
POTASSIUM SERPL-SCNC: 4.1 MMOL/L (ref 3.5–5.3)
PROT SERPL-MCNC: 6.9 G/DL (ref 6.4–8.2)
RBC # BLD AUTO: 5.15 X10*6/UL (ref 4–5.2)
SODIUM SERPL-SCNC: 138 MMOL/L (ref 136–145)
TRIGL SERPL-MCNC: 276 MG/DL (ref 0–149)
TSH SERPL-ACNC: 1.63 MIU/L (ref 0.44–3.98)
VIT B12 SERPL-MCNC: 244 PG/ML (ref 211–911)
VLDL: 55 MG/DL (ref 0–40)
WBC # BLD AUTO: 12.3 X10*3/UL (ref 4.4–11.3)

## 2024-09-24 PROCEDURE — 80053 COMPREHEN METABOLIC PANEL: CPT

## 2024-09-24 PROCEDURE — 3075F SYST BP GE 130 - 139MM HG: CPT | Performed by: PSYCHIATRY & NEUROLOGY

## 2024-09-24 PROCEDURE — 85025 COMPLETE CBC W/AUTO DIFF WBC: CPT

## 2024-09-24 PROCEDURE — 99215 OFFICE O/P EST HI 40 MIN: CPT | Performed by: PSYCHIATRY & NEUROLOGY

## 2024-09-24 PROCEDURE — 3078F DIAST BP <80 MM HG: CPT | Performed by: PSYCHIATRY & NEUROLOGY

## 2024-09-24 PROCEDURE — 83036 HEMOGLOBIN GLYCOSYLATED A1C: CPT

## 2024-09-24 PROCEDURE — 80061 LIPID PANEL: CPT

## 2024-09-24 PROCEDURE — 36415 COLL VENOUS BLD VENIPUNCTURE: CPT

## 2024-09-24 PROCEDURE — 82607 VITAMIN B-12: CPT

## 2024-09-24 PROCEDURE — 4004F PT TOBACCO SCREEN RCVD TLK: CPT | Performed by: PSYCHIATRY & NEUROLOGY

## 2024-09-24 PROCEDURE — 3046F HEMOGLOBIN A1C LEVEL >9.0%: CPT | Performed by: PSYCHIATRY & NEUROLOGY

## 2024-09-24 PROCEDURE — 84443 ASSAY THYROID STIM HORMONE: CPT

## 2024-09-24 PROCEDURE — 3050F LDL-C >= 130 MG/DL: CPT | Performed by: PSYCHIATRY & NEUROLOGY

## 2024-09-24 PROCEDURE — G2211 COMPLEX E/M VISIT ADD ON: HCPCS | Performed by: PSYCHIATRY & NEUROLOGY

## 2024-09-24 RX ORDER — LANOLIN ALCOHOL/MO/W.PET/CERES
1000 CREAM (GRAM) TOPICAL DAILY
Qty: 30 TABLET | Refills: 2 | Status: SHIPPED | OUTPATIENT
Start: 2024-09-24 | End: 2024-09-24 | Stop reason: ENTERED-IN-ERROR

## 2024-09-24 RX ORDER — SUMATRIPTAN 50 MG/1
50 TABLET, FILM COATED ORAL ONCE AS NEEDED
Qty: 9 TABLET | Refills: 2 | Status: SHIPPED | OUTPATIENT
Start: 2024-09-24 | End: 2024-12-23

## 2024-09-24 RX ORDER — LANOLIN ALCOHOL/MO/W.PET/CERES
1000 CREAM (GRAM) TOPICAL DAILY
Qty: 30 TABLET | Refills: 2 | Status: SHIPPED | OUTPATIENT
Start: 2024-09-24 | End: 2024-12-23

## 2024-09-24 SDOH — ECONOMIC STABILITY: FOOD INSECURITY: WITHIN THE PAST 12 MONTHS, YOU WORRIED THAT YOUR FOOD WOULD RUN OUT BEFORE YOU GOT MONEY TO BUY MORE.: OFTEN TRUE

## 2024-09-24 SDOH — ECONOMIC STABILITY - FOOD INSECURITY: FOOD INSECURITY: Z59.41

## 2024-09-24 SDOH — ECONOMIC STABILITY: FOOD INSECURITY: WITHIN THE PAST 12 MONTHS, THE FOOD YOU BOUGHT JUST DIDN'T LAST AND YOU DIDN'T HAVE MONEY TO GET MORE.: OFTEN TRUE

## 2024-09-24 ASSESSMENT — PATIENT HEALTH QUESTIONNAIRE - PHQ9
2. FEELING DOWN, DEPRESSED OR HOPELESS: SEVERAL DAYS
10. IF YOU CHECKED OFF ANY PROBLEMS, HOW DIFFICULT HAVE THESE PROBLEMS MADE IT FOR YOU TO DO YOUR WORK, TAKE CARE OF THINGS AT HOME, OR GET ALONG WITH OTHER PEOPLE: SOMEWHAT DIFFICULT
SUM OF ALL RESPONSES TO PHQ9 QUESTIONS 1 AND 2: 2
1. LITTLE INTEREST OR PLEASURE IN DOING THINGS: SEVERAL DAYS

## 2024-09-24 ASSESSMENT — ENCOUNTER SYMPTOMS
DEPRESSION: 0
LOSS OF SENSATION IN FEET: 0
OCCASIONAL FEELINGS OF UNSTEADINESS: 0

## 2024-09-24 ASSESSMENT — PAIN SCALES - GENERAL: PAINLEVEL: 7

## 2024-09-24 NOTE — PROGRESS NOTES
"In-person visit    Visit type: Follow-up    PCP: Julia Molina MD.    Subjective     Sara Uribe is a 49 y.o. year old female here for virtual follow-up. Last seen 11/8/23.    Patient is accompanied by: Other girlfriend .       HPI    Hx diagnosed MS who I first saw 2018. Most recently on Gilenya. Having \"MS attacks\" but atypical--no evidence that they were MS attacks. Recent MRI done 9/2018 during attack hospitalization showed no enhancing lesions. I saw her on 9/25/18. Previous neuro started her on Tecfidera. Had itching (allergy), stopped. She was to get EMG/NCT for RLE/BUE but took a while due to no transportation. Switched to Aubagio 1/2019 after discussion of side effects and monitoring needed. Still with HA. Having BLE pains. Also numbness in UE. I lowered her topiramate to 50mg bid as HA control improved, to help with polypharmacy. Was hospitalized May 2019 for observation at [facility] ED for loss of consciousness, brief per notes. Thought to be due to hypovolemia.    7/9/19 EMG/NCT (Dr Crowe): R ulnar neuropathy, site unable to localized, mild; no bilat CTS, no L cervical radiculopathy  7/10/19 EMG/NCT (Dr CUNNINGHAM): normal BLE, dec recruitment likely due to UMN disease  7/2/19 PSG (BMI 32.5): no SDB, EDS, with snoring; overall AHI 0    Was doing well on Aubagio, no attacks. Pt hospitalized end Aug 2020 for R eye blindness and RUE movements. RUE movements were very atypical and distractible. MRI brain/orbit wwo initially reported with no optic neuritis. Repeat MRI orbit had ? fluid in R optic nerve. 3 days IV solumedrol started. Pt's vision did not improve. NMO antibody checked and was negative. Pt discharged to rehab. We discussed about possibly seeing MS specialist for consideration of other medication other than Aubagio. She wants to consider Emgality for headaches, and to continue topiramate and triptan. She was seeing Dr. Fernandes, neurologist at [place], apparently because \"she did not see eye to " "eye\" with me. Of note, patient previously tried to see Dr. Crowe but she would not see her. She had also seen Dr. Taylor, neuro-ophthalmologist, who diagnosed functional visual loss, and recommended mental health treatment. States Emgality helping with HA and to continue meds including Aubagio. Pt stopped Emgality as HA was better.  I last saw her 11/8/2023, at which time she reported her migraines were better.  She was also having veering towards the right side when walking more than usual.  We discussed I wonder if this could be due to polypharmacy.  Advised stopping amitriptyline, repeat MRI brain with and without contrast, and check vitamin B12 level.  She was to continue Aubagio.     Since last visit, MRI brain done 11/2023 showed several small white matter signal abnormalities, unchanged from prior.  No abnormal enhancement noted.  Vitamin B12 level was not done.  She had recently been hospitalized 8/4/2024 to 8/5/2024 at [facility] for suspected multiple sclerosis exacerbation.  She has been dealing with right foot drop, back spasms, as well as stuttering.  She received 1 g of IV Solu-Medrol, was admitted.  MRI of the brain done did not show any acute findings or abnormal enhancement, and in fact, appeared to be similar in appearance to 2023 MRI brain.    Patient just now coming back today for follow-up.  She came in with her new girlfriend.    States she \"got rid\" of her previous girlfriend.    She continues to stutter. Had speech therapy. Only some words, getting better. Doesn't know how this happened. Mom brought her to hospital.    Her hemoglobin A1c was 9.2 in the hospital.  This has been trending up over the years.  She is blaming the IV steroids for the elevated hemoglobin A1c, which I explained to her is not the reason for that--but she would not hear any of it.    She is using a walker today.  She has an AFO on the right.    HA not discussed today, presumably not much of an issue.    Patient appears to " "be irritable today.    She continues to be on multiple psychiatric medications.  She laments the fact that she does not currently have a psychiatric provider, and that \"there is no one in [facility]\".  She could not/would not travel outside the county because of lack of transportation.    She also feels she is stuck with following with me here as \"I am the only neurologist in the county\".    Finishes sumatriptan 1 box a month.    MS meds:  Rebif - can't remember when taken and for how long (discontinued due to body rejecting)  Avonex - can't remember when taken and for how long (discontinued due to not working)  Gilenya - taken for years, \"had too many attacks\", taken off Nov 2017  Was supposed to be on infusion but never happened because of insurance denial  Tecfidera - 9/25/18-10/18/18 (I asked her to stop due to allergy)  Aubagio - early Jan 2019 to present       Patient Active Problem List   Diagnosis    Benign essential hypertension    Cigarette nicotine dependence without complication    Combined hyperlipidemia    Disturbance of skin sensation    GERD without esophagitis    Chronic migraine without aura without status migrainosus, not intractable    Impingement syndrome of left shoulder    Insomnia    Lumbar radiculopathy    Lumbar spondylolysis    Multiple sclerosis, relapsing-remitting (Multi)    Physical debility    Rotator cuff tear, left    Solitary pulmonary nodule    Rotator cuff tendinitis, left    Tendinitis of upper biceps tendon of left shoulder    Tinea cruris    Type 2 diabetes mellitus with hyperglycemia, without long-term current use of insulin (Multi)    Nausea    Lumbosacral spondylosis without myelopathy    Degeneration of lumbosacral intervertebral disc    Immunocompromised, acquired (Multi)    Osteoarthritis    Sleep apnea    Asthma ([facility])    Essential (primary) hypertension    Fibromyalgia    Bipolar disorder (Multi)    Anxiety disorder, unspecified    Long term (current) use of " opiate analgesic    Epilepsy, unspecified, not intractable, without status epilepticus (Multi)    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 (Multi)    Polypharmacy    B12 deficiency    Impingement syndrome of shoulder region    Migraine headache    Recurrent major depressive disorder, in partial remission (CMS-HCC)    Multiple sclerosis exacerbation (Multi)    Food insecurity       Allergies   Allergen Reactions    Bee Venom Protein (Honey Bee) Anaphylaxis, Hives and Swelling    Latex Hives, Unknown and Rash    Dimethyl Fumarate Unknown       Current Outpatient Medications:     aspirin 81 mg EC tablet, Take by mouth once daily., Disp: , Rfl:     atorvastatin (Lipitor) 40 mg tablet, Take 1 tablet (40 mg) by mouth once daily at bedtime., Disp: , Rfl:     baclofen (Lioresal) 10 mg tablet, Take 1-2 tablets (10-20 mg) by mouth 3 times a day. For hand tremors (Patient taking differently: Take 1-2 tablets (10-20 mg) by mouth if needed for muscle spasms. For hand tremors up to three times per day.), Disp: 270 tablet, Rfl: 3    blood sugar diagnostic (True Metrix Glucose Test Strip) strip, CHECK BLOOD GLUCOSE once daily, Disp: , Rfl:     brexpiprazole (Rexulti) 1 mg tablet, Take 1 tablet (1 mg) by mouth once daily., Disp: 30 tablet, Rfl: 11    dapagliflozin (Farxiga) 5 mg, Take 1 tablet (5 mg) by mouth once daily in the morning. Take before meals., Disp: , Rfl:     dexlansoprazole (Dexilant) 60 mg DR capsule, Take 1 capsule (60 mg) by mouth once daily., Disp: , Rfl:     DULoxetine (Cymbalta) 30 mg DR capsule, Take 1 capsule (30 mg) by mouth once daily in the morning., Disp: , Rfl:     DULoxetine (Cymbalta) 60 mg DR capsule, Take 1 capsule (60 mg) by mouth once daily at bedtime., Disp: 90 capsule, Rfl: 3    lamoTRIgine (LaMICtal) 150 mg tablet, Take 1 tablet (150 mg) by mouth once daily at bedtime., Disp: , Rfl:     levothyroxine (LevoxyL) 25 mcg tablet, Take by mouth once daily in the morning. Take before meals., Disp: , Rfl:      "melatonin 5 mg tablet, Take 1 tablet (5 mg) by mouth as needed at bedtime., Disp: , Rfl:     meloxicam (Mobic) 15 mg tablet, Take 1 tablet (15 mg) by mouth once daily., Disp: , Rfl:     metFORMIN (Glucophage) 1,000 mg tablet, Take 1 tablet (1,000 mg) by mouth 2 times daily (morning and late afternoon)., Disp: 180 tablet, Rfl: 0    teriflunomide (AUBAGIO) 14 mg tablet tablet, TAKE 1 TABLET BY MOUTH 1 TIME A DAY, Disp: 30 tablet, Rfl: 0    traZODone (Desyrel) 50 mg tablet, Take 1 tablet (50 mg) by mouth once daily at bedtime., Disp: , Rfl:     albuterol 90 mcg/actuation inhaler, INHALE 2 PUFFS BY MOUTH EVERY 6 HOURS AS NEEDED, Disp: 8.5 g, Rfl: 0    SUMAtriptan (Imitrex) 50 mg tablet, Take 1 tablet (50 mg) by mouth 1 time if needed for migraine (MAY REPEAT DOSE IN 2 HOURS.  MAX 2 TABS IN 24 HOURS)., Disp: 9 tablet, Rfl: 2     Objective     /69 (BP Location: Right arm, Patient Position: Sitting)   Pulse 84   Resp 18   Wt 101 kg (222 lb 9.6 oz)   BMI 32.87 kg/m²        Awake, alert, oriented x3, in no distress  Well-nourished, seated    Mental status exam as above, conversant   Full EOMs intact, no nystagmus, no ptosis   No facial droop   Hearing grossly intact   No dysarthria  (+) intermittent stuttering    Motor strength is at least antigravity on all extremities  I did not have her stand or walk      Assessment/Plan     1.  Stuttering  2.  Right foot drop  Discussed with patient, acute onset sudden adult onset stuttering can be (and is usually) psychologic in origin  Patient has had right foot drop in the past with negative workup  MRI brain with and without contrast done 8/2024 did not show any acute findings or abnormal enhancement     3.  Multiple sclerosis, relapsing remitting  Previous \"attacks\" that she and partner attributes to MS I don't think are real MS exacerbation and could be due to non-MS causes  Allergy to Tecfidera  On Aubagio 14mg daily, continue  Last MRI brain 8/2024 reviewed, stable from " "2023     4.  Functional visual loss  Previously saw Dr. Taylor, neuro-ophthalmologist, who diagnosed this    5.  Chronic migraine without aura, not intractable  IMPROVED  Strange on again, off again migraine HA  s/p topiramate--stopped as not needed anymore  s/p amitriptyline--stopped as not needed anymore  s/p Emgality--stopped as not needed anymore--? having injection pain (vs from Trulicity)  On sumatriptan prn, continue    6.  B12 deficiency  Patient did not check B12 level yet--just zoë this morning    7.  Food insecurity    Patient appears to be very irritable today.  This is the most irritable that I have seen her.  She laments the fact that she does not have a psychiatric provider and there is nobody \"in the county for her to see\".  I also tried to explain to her about her diabetes, but she would not hear any of it.  She refuses to change her diet.      Plans:  Continue Aubagio/sumatriptan prn  Improve your depression/anxiety  Referral to food for life  Await vit B12 level  Continue vascular risk factor control care of PCP    Rtc 6 mo    All questions were answered.  Pt knows how to contact my office in case pt has any questions or concerns.    Gulshan Falcon MD        "

## 2024-09-24 NOTE — LETTER
"September 24, 2024     Julia Molina MD  [address] [facility] [address]  [place] [id number]    Patient: Sara Uribe   YOB: 1975   Date of Visit: 9/24/2024       Dear Dr. Julia Molina MD:    Thank you for referring Sara Uribe to me for evaluation. Below are my notes for this consultation.  If you have questions, please do not hesitate to call me. I look forward to following your patient along with you.       Sincerely,     Gulshan Falcon MD      CC: No Recipients  ______________________________________________________________________________________    In-person visit    Visit type: Follow-up    PCP: Julia Molina MD.    Subjective    Sara Uribe is a 49 y.o. year old female here for virtual follow-up. Last seen 11/8/23.    Patient is accompanied by: Other girlfriend .       HPI    Hx diagnosed MS who I first saw 2018. Most recently on Gilenya. Having \"MS attacks\" but atypical--no evidence that they were MS attacks. Recent MRI done 9/2018 during attack hospitalization showed no enhancing lesions. I saw her on 9/25/18. Previous neuro started her on Tecfidera. Had itching (allergy), stopped. She was to get EMG/NCT for RLE/BUE but took a while due to no transportation. Switched to Aubagio 1/2019 after discussion of side effects and monitoring needed. Still with HA. Having BLE pains. Also numbness in UE. I lowered her topiramate to 50mg bid as HA control improved, to help with polypharmacy. Was hospitalized May 2019 for observation at [facility] ED for loss of consciousness, brief per notes. Thought to be due to hypovolemia.    7/9/19 EMG/NCT (Dr Crowe): R ulnar neuropathy, site unable to localized, mild; no bilat CTS, no L cervical radiculopathy  7/10/19 EMG/NCT (Dr CUNNINGHAM): normal BLE, dec recruitment likely due to UMN disease  7/2/19 PSG (BMI 32.5): no SDB, EDS, with snoring; overall AHI 0    Was doing well on Aubagio, no attacks. Pt hospitalized end " "Aug 2020 for R eye blindness and RUE movements. RUE movements were very atypical and distractible. MRI brain/orbit wwo initially reported with no optic neuritis. Repeat MRI orbit had ? fluid in R optic nerve. 3 days IV solumedrol started. Pt's vision did not improve. NMO antibody checked and was negative. Pt discharged to rehab. We discussed about possibly seeing MS specialist for consideration of other medication other than Aubagio. She wants to consider Emgality for headaches, and to continue topiramate and triptan. She was seeing Dr. Fernandes, neurologist at [place], apparently because \"she did not see eye to eye\" with me. Of note, patient previously tried to see Dr. Crowe but she would not see her. She had also seen Dr. Taylor, neuro-ophthalmologist, who diagnosed functional visual loss, and recommended mental health treatment. States Emgality helping with HA and to continue meds including Aubagio. Pt stopped Emgality as HA was better.  I last saw her 11/8/2023, at which time she reported her migraines were better.  She was also having veering towards the right side when walking more than usual.  We discussed I wonder if this could be due to polypharmacy.  Advised stopping amitriptyline, repeat MRI brain with and without contrast, and check vitamin B12 level.  She was to continue Aubagio.     Since last visit, MRI brain done 11/2023 showed several small white matter signal abnormalities, unchanged from prior.  No abnormal enhancement noted.  Vitamin B12 level was not done.  She had recently been hospitalized 8/4/2024 to 8/5/2024 at [facility] for suspected multiple sclerosis exacerbation.  She has been dealing with right foot drop, back spasms, as well as stuttering.  She received 1 g of IV Solu-Medrol, was admitted.  MRI of the brain done did not show any acute findings or abnormal enhancement, and in fact, appeared to be similar in appearance to 2023 MRI brain.    Patient just now coming back today for follow-up. " " She came in with her new girlfriend.    States she \"got rid\" of her previous girlfriend.    She continues to stutter. Had speech therapy. Only some words, getting better. Doesn't know how this happened. Mom brought her to hospital.    Her hemoglobin A1c was 9.2 in the hospital.  This has been trending up over the years.  She is blaming the IV steroids for the elevated hemoglobin A1c, which I explained to her is not the reason for that--but she would not hear any of it.    She is using a walker today.  She has an AFO on the right.    HA not discussed today, presumably not much of an issue.    Patient appears to be irritable today.    She continues to be on multiple psychiatric medications.  She laments the fact that she does not currently have a psychiatric provider, and that \"there is no one in [facility]\".  She could not/would not travel outside the [facility] because of lack of transportation.    She also feels she is stuck with following with me here as \"I am the only neurologist in the [facility]\".    Finishes sumatriptan 1 box a month.    MS meds:  Rebif - can't remember when taken and for how long (discontinued due to body rejecting)  Avonex - can't remember when taken and for how long (discontinued due to not working)  Gilenya - taken for years, \"had too many attacks\", taken off Nov 2017  Was supposed to be on infusion but never happened because of insurance denial  Tecfidera - 9/25/18-10/18/18 (I asked her to stop due to allergy)  Aubagio - early Jan 2019 to present       Patient Active Problem List   Diagnosis   • Benign essential hypertension   • Cigarette nicotine dependence without complication   • Combined hyperlipidemia   • Disturbance of skin sensation   • GERD without esophagitis   • Chronic migraine without aura without status migrainosus, not intractable   • Impingement syndrome of left shoulder   • Insomnia   • Lumbar radiculopathy   • Lumbar spondylolysis   • Multiple sclerosis, relapsing-remitting " (Multi)   • Physical debility   • Rotator cuff tear, left   • Solitary pulmonary nodule   • Rotator cuff tendinitis, left   • Tendinitis of upper biceps tendon of left shoulder   • Tinea cruris   • Type 2 diabetes mellitus with hyperglycemia, without long-term current use of insulin (Multi)   • Nausea   • Lumbosacral spondylosis without myelopathy   • Degeneration of lumbosacral intervertebral disc   • Immunocompromised, acquired (Multi)   • Osteoarthritis   • Sleep apnea   • Asthma ([facility])   • Essential (primary) hypertension   • Fibromyalgia   • Bipolar disorder (Multi)   • Anxiety disorder, unspecified   • Long term (current) use of opiate analgesic   • Epilepsy, unspecified, not intractable, without status epilepticus (Multi)   • 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 (Multi)   • Polypharmacy   • B12 deficiency   • Impingement syndrome of shoulder region   • Migraine headache   • Recurrent major depressive disorder, in partial remission (CMS-HCC)   • Multiple sclerosis exacerbation (Multi)   • Food insecurity       Allergies   Allergen Reactions   • Bee Venom Protein (Honey Bee) Anaphylaxis, Hives and Swelling   • Latex Hives, Unknown and Rash   • Dimethyl Fumarate Unknown       Current Outpatient Medications:   •  aspirin 81 mg EC tablet, Take by mouth once daily., Disp: , Rfl:   •  atorvastatin (Lipitor) 40 mg tablet, Take 1 tablet (40 mg) by mouth once daily at bedtime., Disp: , Rfl:   •  baclofen (Lioresal) 10 mg tablet, Take 1-2 tablets (10-20 mg) by mouth 3 times a day. For hand tremors (Patient taking differently: Take 1-2 tablets (10-20 mg) by mouth if needed for muscle spasms. For hand tremors up to three times per day.), Disp: 270 tablet, Rfl: 3  •  blood sugar diagnostic (True Metrix Glucose Test Strip) strip, CHECK BLOOD GLUCOSE once daily, Disp: , Rfl:   •  brexpiprazole (Rexulti) 1 mg tablet, Take 1 tablet (1 mg) by mouth once daily., Disp: 30 tablet, Rfl: 11  •  dapagliflozin (Farxiga) 5 mg, Take  1 tablet (5 mg) by mouth once daily in the morning. Take before meals., Disp: , Rfl:   •  dexlansoprazole (Dexilant) 60 mg DR capsule, Take 1 capsule (60 mg) by mouth once daily., Disp: , Rfl:   •  DULoxetine (Cymbalta) 30 mg DR capsule, Take 1 capsule (30 mg) by mouth once daily in the morning., Disp: , Rfl:   •  DULoxetine (Cymbalta) 60 mg DR capsule, Take 1 capsule (60 mg) by mouth once daily at bedtime., Disp: 90 capsule, Rfl: 3  •  lamoTRIgine (LaMICtal) 150 mg tablet, Take 1 tablet (150 mg) by mouth once daily at bedtime., Disp: , Rfl:   •  levothyroxine (LevoxyL) 25 mcg tablet, Take by mouth once daily in the morning. Take before meals., Disp: , Rfl:   •  melatonin 5 mg tablet, Take 1 tablet (5 mg) by mouth as needed at bedtime., Disp: , Rfl:   •  meloxicam (Mobic) 15 mg tablet, Take 1 tablet (15 mg) by mouth once daily., Disp: , Rfl:   •  metFORMIN (Glucophage) 1,000 mg tablet, Take 1 tablet (1,000 mg) by mouth 2 times daily (morning and late afternoon)., Disp: 180 tablet, Rfl: 0  •  teriflunomide (AUBAGIO) 14 mg tablet tablet, TAKE 1 TABLET BY MOUTH 1 TIME A DAY, Disp: 30 tablet, Rfl: 0  •  traZODone (Desyrel) 50 mg tablet, Take 1 tablet (50 mg) by mouth once daily at bedtime., Disp: , Rfl:   •  albuterol 90 mcg/actuation inhaler, INHALE 2 PUFFS BY MOUTH EVERY 6 HOURS AS NEEDED, Disp: 8.5 g, Rfl: 0  •  SUMAtriptan (Imitrex) 50 mg tablet, Take 1 tablet (50 mg) by mouth 1 time if needed for migraine (MAY REPEAT DOSE IN 2 HOURS.  MAX 2 TABS IN 24 HOURS)., Disp: 9 tablet, Rfl: 2     Objective    /69 (BP Location: Right arm, Patient Position: Sitting)   Pulse 84   Resp 18   Wt 101 kg (222 lb 9.6 oz)   BMI 32.87 kg/m²        Awake, alert, oriented x3, in no distress  Well-nourished, seated    Mental status exam as above, conversant   Full EOMs intact, no nystagmus, no ptosis   No facial droop   Hearing grossly intact   No dysarthria  (+) intermittent stuttering    Motor strength is at least antigravity  "on all extremities  I did not have her stand or walk      Assessment/Plan    1.  Stuttering  2.  Right foot drop  Discussed with patient, acute onset sudden adult onset stuttering can be (and is usually) psychologic in origin  Patient has had right foot drop in the past with negative workup  MRI brain with and without contrast done 8/2024 did not show any acute findings or abnormal enhancement     3.  Multiple sclerosis, relapsing remitting  Previous \"attacks\" that she and partner attributes to MS I don't think are real MS exacerbation and could be due to non-MS causes  Allergy to Tecfidera  On Aubagio 14mg daily, continue  Last MRI brain 8/2024 reviewed, stable from 2023     4.  Functional visual loss  Previously saw Dr. Taylor, neuro-ophthalmologist, who diagnosed this    5.  Chronic migraine without aura, not intractable  IMPROVED  Strange on again, off again migraine HA  s/p topiramate--stopped as not needed anymore  s/p amitriptyline--stopped as not needed anymore  s/p Emgality--stopped as not needed anymore--? having injection pain (vs from Trulicity)  On sumatriptan prn, continue    6.  B12 deficiency  Patient did not check B12 level yet--just zoë this morning    7.  Food insecurity    Patient appears to be very irritable today.  This is the most irritable that I have seen her.  She laments the fact that she does not have a psychiatric provider and there is nobody \"in the [facility] for her to see\".  I also tried to explain to her about her diabetes, but she would not hear any of it.  She refuses to change her diet.      Plans:  Continue Aubagio/sumatriptan prn  Improve your depression/anxiety  Referral to food for life  Await vit B12 level  Continue vascular risk factor control care of PCP    Rtc 6 mo    All questions were answered.  Pt knows how to contact my office in case pt has any questions or concerns.    Gulshan Falcon MD          "

## 2024-09-24 NOTE — PATIENT INSTRUCTIONS
Plans:  Continue Aubagio/sumatriptan prn  2.   Improve your depression/anxiety  3.   Referral to food for life  4.   Await vit B12 level  5.  Continue vascular risk factor control care of PCP    Rtc 6 mo

## 2024-09-25 LAB
EST. AVERAGE GLUCOSE BLD GHB EST-MCNC: 249 MG/DL
HBA1C MFR BLD: 10.3 %

## 2024-09-27 ENCOUNTER — CLINICAL SUPPORT (OUTPATIENT)
Dept: NUTRITION | Facility: HOSPITAL | Age: 49
End: 2024-09-27
Payer: COMMERCIAL

## 2024-09-27 DIAGNOSIS — Z59.41 FOOD INSECURITY: ICD-10-CM

## 2024-09-27 SDOH — ECONOMIC STABILITY - FOOD INSECURITY: FOOD INSECURITY: Z59.41

## 2024-09-27 NOTE — PROGRESS NOTES
Food For Life  Diet Recommendation 1: Diabetes  Diet Recommendation 2: Heart Healthy  Food Intolerance Avoidance: NKFA  Household Size: 2 Family Members  Interventions: Referral Number: 1st 6 Mo Referral 6 Mos  Interventions: Visit Number: 1 of 6 Visits - Max 6 Visits/Referral Each 6 Mo Period  Education Today: [employer] Meals  Recipes Today: verbal  Grains: 25-50% Whole  Fruit: % Fresh  Vegetables: Fresh - 100%  Proteins: 3 Plant-based Items  Originating Site of Referral Order: Gulshan Falcon MD  Initials of RD Assisting Today: ANA LAURA

## 2024-09-30 ENCOUNTER — HOSPITAL ENCOUNTER (OUTPATIENT)
Dept: RADIOLOGY | Facility: HOSPITAL | Age: 49
Discharge: HOME | End: 2024-09-30
Payer: COMMERCIAL

## 2024-09-30 DIAGNOSIS — Z12.31 VISIT FOR SCREENING MAMMOGRAM: ICD-10-CM

## 2024-09-30 PROCEDURE — 77063 BREAST TOMOSYNTHESIS BI: CPT | Performed by: RADIOLOGY

## 2024-09-30 PROCEDURE — 77067 SCR MAMMO BI INCL CAD: CPT

## 2024-09-30 PROCEDURE — 77067 SCR MAMMO BI INCL CAD: CPT | Performed by: RADIOLOGY

## 2024-10-02 DIAGNOSIS — E78.2 COMBINED HYPERLIPIDEMIA: ICD-10-CM

## 2024-10-02 DIAGNOSIS — E11.65 TYPE 2 DIABETES MELLITUS WITH HYPERGLYCEMIA, WITHOUT LONG-TERM CURRENT USE OF INSULIN: Primary | ICD-10-CM

## 2024-10-02 RX ORDER — ATORVASTATIN CALCIUM 80 MG/1
80 TABLET, FILM COATED ORAL DAILY
Qty: 90 TABLET | Refills: 3 | Status: SHIPPED | OUTPATIENT
Start: 2024-10-02 | End: 2025-10-02

## 2024-10-02 RX ORDER — SEMAGLUTIDE 0.68 MG/ML
0.25 INJECTION, SOLUTION SUBCUTANEOUS
Qty: 3 ML | Refills: 2 | Status: SHIPPED | OUTPATIENT
Start: 2024-10-02 | End: 2025-03-21

## 2024-10-16 ENCOUNTER — HOSPITAL ENCOUNTER (EMERGENCY)
Age: 49
Discharge: HOME | End: 2024-10-16
Payer: COMMERCIAL

## 2024-10-16 ENCOUNTER — APPOINTMENT (OUTPATIENT)
Dept: CT IMAGING | Age: 49
End: 2024-10-16
Payer: COMMERCIAL

## 2024-10-16 VITALS
TEMPERATURE: 98.4 F | RESPIRATION RATE: 14 BRPM | BODY MASS INDEX: 32.58 KG/M2 | OXYGEN SATURATION: 97 % | DIASTOLIC BLOOD PRESSURE: 92 MMHG | WEIGHT: 220 LBS | HEIGHT: 69 IN | SYSTOLIC BLOOD PRESSURE: 150 MMHG | HEART RATE: 97 BPM

## 2024-10-16 DIAGNOSIS — M54.50 ACUTE EXACERBATION OF CHRONIC LOW BACK PAIN: Primary | ICD-10-CM

## 2024-10-16 DIAGNOSIS — G89.29 ACUTE EXACERBATION OF CHRONIC LOW BACK PAIN: Primary | ICD-10-CM

## 2024-10-16 LAB
BACTERIA URNS QL MICRO: ABNORMAL
BILIRUB UR QL STRIP: NEGATIVE
CLARITY UR: CLEAR
COLOR UR: YELLOW
EPI CELLS #/AREA URNS HPF: ABNORMAL /HPF
GLUCOSE UR STRIP-MCNC: >=1000 MG/DL
HCG UR QL: NEGATIVE
HGB UR QL STRIP.AUTO: NEGATIVE
KETONES UR STRIP-MCNC: NEGATIVE MG/DL
LEUKOCYTE ESTERASE UR QL STRIP: NEGATIVE
NITRITE UR QL STRIP: NEGATIVE
PH UR STRIP: 5.5 [PH] (ref 5–9)
PROT UR STRIP-MCNC: NEGATIVE MG/DL
RBC #/AREA URNS HPF: ABNORMAL /HPF
SP GR UR STRIP: 1.01 (ref 1–1.03)
UROBILINOGEN UR STRIP-ACNC: 0.2 EU/DL (ref 0–1)
WBC #/AREA URNS HPF: ABNORMAL /HPF

## 2024-10-16 PROCEDURE — 72131 CT LUMBAR SPINE W/O DYE: CPT

## 2024-10-16 PROCEDURE — 81001 URINALYSIS AUTO W/SCOPE: CPT

## 2024-10-16 PROCEDURE — 6360000002 HC RX W HCPCS: Performed by: NURSE PRACTITIONER

## 2024-10-16 PROCEDURE — 6370000000 HC RX 637 (ALT 250 FOR IP): Performed by: NURSE PRACTITIONER

## 2024-10-16 PROCEDURE — 99284 EMERGENCY DEPT VISIT MOD MDM: CPT

## 2024-10-16 PROCEDURE — 96372 THER/PROPH/DIAG INJ SC/IM: CPT

## 2024-10-16 PROCEDURE — 84703 CHORIONIC GONADOTROPIN ASSAY: CPT

## 2024-10-16 RX ORDER — BACLOFEN 10 MG/1
10 TABLET ORAL 3 TIMES DAILY
COMMUNITY

## 2024-10-16 RX ORDER — MELOXICAM 15 MG/1
15 TABLET ORAL DAILY
COMMUNITY

## 2024-10-16 RX ORDER — TERIFLUNOMIDE 14 MG/1
14 TABLET, FILM COATED ORAL DAILY
COMMUNITY

## 2024-10-16 RX ORDER — TRAZODONE HYDROCHLORIDE 50 MG/1
50 TABLET, FILM COATED ORAL NIGHTLY
COMMUNITY

## 2024-10-16 RX ORDER — LEVOTHYROXINE SODIUM 25 UG/1
25 TABLET ORAL DAILY
COMMUNITY

## 2024-10-16 RX ORDER — DEXLANSOPRAZOLE 60 MG/1
60 CAPSULE, DELAYED RELEASE ORAL DAILY
COMMUNITY

## 2024-10-16 RX ORDER — DULOXETIN HYDROCHLORIDE 30 MG/1
30 CAPSULE, DELAYED RELEASE ORAL DAILY
COMMUNITY

## 2024-10-16 RX ORDER — DAPAGLIFLOZIN 5 MG/1
5 TABLET, FILM COATED ORAL EVERY MORNING
COMMUNITY

## 2024-10-16 RX ORDER — CYCLOBENZAPRINE HCL 10 MG
10 TABLET ORAL 3 TIMES DAILY PRN
Qty: 21 TABLET | Refills: 0 | Status: SHIPPED | OUTPATIENT
Start: 2024-10-16 | End: 2024-10-26

## 2024-10-16 RX ORDER — DULOXETIN HYDROCHLORIDE 60 MG/1
60 CAPSULE, DELAYED RELEASE ORAL DAILY
COMMUNITY

## 2024-10-16 RX ORDER — LAMOTRIGINE 150 MG/1
150 TABLET ORAL DAILY
COMMUNITY

## 2024-10-16 RX ORDER — ASPIRIN 81 MG/1
81 TABLET ORAL DAILY
COMMUNITY

## 2024-10-16 RX ORDER — DEXAMETHASONE SODIUM PHOSPHATE 10 MG/ML
10 INJECTION INTRAMUSCULAR; INTRAVENOUS ONCE
Status: COMPLETED | OUTPATIENT
Start: 2024-10-16 | End: 2024-10-16

## 2024-10-16 RX ORDER — METHYLPREDNISOLONE 4 MG
TABLET, DOSE PACK ORAL
Qty: 21 TABLET | Refills: 0 | Status: SHIPPED | OUTPATIENT
Start: 2024-10-16 | End: 2024-10-22

## 2024-10-16 RX ORDER — SUMATRIPTAN 50 MG/1
50 TABLET, FILM COATED ORAL
COMMUNITY

## 2024-10-16 RX ORDER — HYDROCODONE BITARTRATE AND ACETAMINOPHEN 5; 325 MG/1; MG/1
1 TABLET ORAL ONCE
Status: COMPLETED | OUTPATIENT
Start: 2024-10-16 | End: 2024-10-16

## 2024-10-16 RX ORDER — ORPHENADRINE CITRATE 30 MG/ML
60 INJECTION INTRAMUSCULAR; INTRAVENOUS ONCE
Status: COMPLETED | OUTPATIENT
Start: 2024-10-16 | End: 2024-10-16

## 2024-10-16 RX ORDER — ATORVASTATIN CALCIUM 80 MG/1
80 TABLET, FILM COATED ORAL DAILY
COMMUNITY

## 2024-10-16 RX ORDER — LANOLIN ALCOHOL/MO/W.PET/CERES
5 CREAM (GRAM) TOPICAL DAILY
COMMUNITY

## 2024-10-16 RX ADMIN — ORPHENADRINE CITRATE 60 MG: 30 INJECTION, SOLUTION INTRAMUSCULAR; INTRAVENOUS at 11:15

## 2024-10-16 RX ADMIN — DEXAMETHASONE SODIUM PHOSPHATE 10 MG: 10 INJECTION INTRAMUSCULAR; INTRAVENOUS at 11:16

## 2024-10-16 RX ADMIN — HYDROCODONE BITARTRATE AND ACETAMINOPHEN 1 TABLET: 5; 325 TABLET ORAL at 11:14

## 2024-10-16 ASSESSMENT — PAIN SCALES - GENERAL
PAINLEVEL_OUTOF10: 7

## 2024-10-16 ASSESSMENT — PAIN DESCRIPTION - DESCRIPTORS
DESCRIPTORS: SHARP
DESCRIPTORS: SHARP
DESCRIPTORS: ACHING;DISCOMFORT;SHARP

## 2024-10-16 ASSESSMENT — PAIN DESCRIPTION - LOCATION
LOCATION: BACK

## 2024-10-16 ASSESSMENT — PAIN DESCRIPTION - ORIENTATION
ORIENTATION: LOWER
ORIENTATION: LOWER

## 2024-10-16 ASSESSMENT — PAIN - FUNCTIONAL ASSESSMENT: PAIN_FUNCTIONAL_ASSESSMENT: 0-10

## 2024-10-16 NOTE — ED PROVIDER NOTES
Independent DARCY Visit.        [facility]  Department of Emergency Medicine   ED  Encounter Note  Admit Date/RoomTime: 10/16/2024  9:53 AM  ED Room:     NAME: Skyla Dover  : 1975  MRN: [id number]     Chief Complaint:  Back Pain (Felt a pop a few days ago in the lower back, it is getting worse)    History of Present Illness       Skyla Dover is a 49 y.o. old female with a prior history of ongoing chronic back pain from no prior injury, presents to the emergency department by private vehicle, for complaints of a several day history of left-sided low back pain without radiation.  Patient states that she has a baseline degenerative disc disease and bulging disks, states that she was lifting a heavy cage and felt a pop in her back and still has pain.  Pain is worse with sitting and standing, better with laying.  She denies any saddle anesthesia, bowel or bladder incontinence, bladder retention, fevers, recent spinal injection, recent spinal manipulation.  Does have a history of MS.  Denies any injecting drug use or cancer.  ROS   Pertinent positives and negatives are stated within HPI, all other systems reviewed and are negative.    Past Medical History:  has no past medical history on file.    Surgical History:  has no past surgical history on file.    Social History:  reports that she has been smoking cigarettes. She has never used smokeless tobacco. She reports that she does not currently use alcohol. She reports current drug use. Drug: Marijuana (Weed).    Family History: family history is not on file.     Allergies: Latex and Bee venom    Physical Exam   Oxygen Saturation Interpretation: Normal.        ED Triage Vitals   BP Systolic BP Percentile Diastolic BP Percentile Temp Temp Source Pulse Respirations SpO2   10/16/24 0942 -- -- 10/16/24 0942 10/16/24 0942 10/16/24 0942 10/16/24 0942 10/16/24 0942   (!) 150/92   98.4 °F (36.9 °C) Oral 97 14 97 %      Height Weight -

## 2024-10-16 NOTE — DISCHARGE INSTRUCTIONS
CT LUMBAR SPINE WO CONTRAST   Final Result   1. No evidence of acute osseous abnormality of the lumbar spine.   2. Right paracentral disc protrusion at L5-S1 contacting and displacing the   traversing right S1 nerve root as it enters the lateral recess. Correlate for   any right S1 radiculopathy.   3. Fatty liver.              You do have some lumbar stenosis and some spurring this is likely causing the pain.  You will start a Medrol Dosepak 3 times daily as needed for muscle spasms, Medrol Dosepak as directed

## 2024-10-17 ENCOUNTER — TELEPHONE (OUTPATIENT)
Dept: PHYSICAL MEDICINE AND REHAB | Age: 49
End: 2024-10-17

## 2024-10-17 NOTE — TELEPHONE ENCOUNTER
----- Message from Dr. Fior Branch DO sent at 10/17/2024  9:36 AM EDT -----    ----- Message -----  From: Discharge Provider, Automatic  Sent: 10/17/2024   1:49 AM EDT  To: Fior Branch DO

## 2024-10-18 DIAGNOSIS — G35 MULTIPLE SCLEROSIS, RELAPSING-REMITTING (MULTI): ICD-10-CM

## 2024-10-21 RX ORDER — TERIFLUNOMIDE 14 MG/1
TABLET, FILM COATED ORAL DAILY
Qty: 30 TABLET | Refills: 4 | Status: SHIPPED | OUTPATIENT
Start: 2024-10-21

## 2024-10-31 ENCOUNTER — TELEPHONE (OUTPATIENT)
Dept: PRIMARY CARE | Facility: CLINIC | Age: 49
End: 2024-10-31
Payer: COMMERCIAL

## 2024-10-31 DIAGNOSIS — E11.65 TYPE 2 DIABETES MELLITUS WITH HYPERGLYCEMIA, WITHOUT LONG-TERM CURRENT USE OF INSULIN: ICD-10-CM

## 2024-11-01 ENCOUNTER — CLINICAL SUPPORT (OUTPATIENT)
Dept: NUTRITION | Facility: HOSPITAL | Age: 49
End: 2024-11-01
Payer: COMMERCIAL

## 2024-11-13 ENCOUNTER — PATIENT OUTREACH (OUTPATIENT)
Dept: PRIMARY CARE | Facility: CLINIC | Age: 49
End: 2024-11-13
Payer: MEDICAID

## 2024-11-18 ENCOUNTER — APPOINTMENT (OUTPATIENT)
Dept: PRIMARY CARE | Facility: CLINIC | Age: 49
End: 2024-11-18
Payer: COMMERCIAL

## 2024-11-18 VITALS
BODY MASS INDEX: 33.15 KG/M2 | HEIGHT: 69 IN | RESPIRATION RATE: 16 BRPM | OXYGEN SATURATION: 98 % | WEIGHT: 223.8 LBS | HEART RATE: 86 BPM | DIASTOLIC BLOOD PRESSURE: 78 MMHG | SYSTOLIC BLOOD PRESSURE: 122 MMHG

## 2024-11-18 DIAGNOSIS — G35 MULTIPLE SCLEROSIS, RELAPSING-REMITTING (MULTI): ICD-10-CM

## 2024-11-18 DIAGNOSIS — E11.65 TYPE 2 DIABETES MELLITUS WITH HYPERGLYCEMIA, WITHOUT LONG-TERM CURRENT USE OF INSULIN: ICD-10-CM

## 2024-11-18 DIAGNOSIS — F51.01 PRIMARY INSOMNIA: Primary | ICD-10-CM

## 2024-11-18 DIAGNOSIS — L40.9 PSORIASIS: ICD-10-CM

## 2024-11-18 DIAGNOSIS — J45.20 MILD INTERMITTENT ASTHMA, UNSPECIFIED WHETHER COMPLICATED (HHS-HCC): ICD-10-CM

## 2024-11-18 DIAGNOSIS — F31.32 BIPOLAR AFFECTIVE DISORDER, CURRENTLY DEPRESSED, MODERATE (MULTI): ICD-10-CM

## 2024-11-18 DIAGNOSIS — Z12.12 ENCOUNTER FOR COLORECTAL CANCER SCREENING USING COLOGUARD TEST: ICD-10-CM

## 2024-11-18 DIAGNOSIS — E78.2 COMBINED HYPERLIPIDEMIA: ICD-10-CM

## 2024-11-18 DIAGNOSIS — Z12.11 ENCOUNTER FOR COLORECTAL CANCER SCREENING USING COLOGUARD TEST: ICD-10-CM

## 2024-11-18 DIAGNOSIS — I10 BENIGN ESSENTIAL HYPERTENSION: ICD-10-CM

## 2024-11-18 DIAGNOSIS — Z13.6 SCREENING FOR CARDIOVASCULAR CONDITION: ICD-10-CM

## 2024-11-18 DIAGNOSIS — F32.A ANXIETY AND DEPRESSION: ICD-10-CM

## 2024-11-18 DIAGNOSIS — F41.9 ANXIETY AND DEPRESSION: ICD-10-CM

## 2024-11-18 PROBLEM — R29.6 MULTIPLE FALLS: Status: ACTIVE | Noted: 2024-11-18

## 2024-11-18 PROCEDURE — 90656 IIV3 VACC NO PRSV 0.5 ML IM: CPT | Performed by: FAMILY MEDICINE

## 2024-11-18 PROCEDURE — 90471 IMMUNIZATION ADMIN: CPT | Performed by: FAMILY MEDICINE

## 2024-11-18 PROCEDURE — 4004F PT TOBACCO SCREEN RCVD TLK: CPT | Performed by: FAMILY MEDICINE

## 2024-11-18 PROCEDURE — 3074F SYST BP LT 130 MM HG: CPT | Performed by: FAMILY MEDICINE

## 2024-11-18 PROCEDURE — 3078F DIAST BP <80 MM HG: CPT | Performed by: FAMILY MEDICINE

## 2024-11-18 PROCEDURE — 99214 OFFICE O/P EST MOD 30 MIN: CPT | Performed by: FAMILY MEDICINE

## 2024-11-18 PROCEDURE — 3046F HEMOGLOBIN A1C LEVEL >9.0%: CPT | Performed by: FAMILY MEDICINE

## 2024-11-18 PROCEDURE — 3008F BODY MASS INDEX DOCD: CPT | Performed by: FAMILY MEDICINE

## 2024-11-18 PROCEDURE — 3050F LDL-C >= 130 MG/DL: CPT | Performed by: FAMILY MEDICINE

## 2024-11-18 RX ORDER — TRAZODONE HYDROCHLORIDE 100 MG/1
100 TABLET ORAL NIGHTLY PRN
Qty: 90 TABLET | Refills: 3 | Status: SHIPPED | OUTPATIENT
Start: 2024-11-18 | End: 2025-11-18

## 2024-11-18 RX ORDER — DULOXETIN HYDROCHLORIDE 60 MG/1
60 CAPSULE, DELAYED RELEASE ORAL NIGHTLY
Qty: 90 CAPSULE | Refills: 3 | Status: SHIPPED | OUTPATIENT
Start: 2024-11-18 | End: 2025-11-18

## 2024-11-18 RX ORDER — METFORMIN HYDROCHLORIDE 1000 MG/1
1000 TABLET ORAL
Qty: 180 TABLET | Refills: 0 | Status: SHIPPED | OUTPATIENT
Start: 2024-11-18 | End: 2025-02-16

## 2024-11-18 RX ORDER — FLUOCINOLONE ACETONIDE 0.11 MG/ML
1 OIL TOPICAL DAILY
Qty: 118.28 ML | Refills: 1 | Status: SHIPPED | OUTPATIENT
Start: 2024-11-18 | End: 2024-12-18

## 2024-11-18 RX ORDER — DAPAGLIFLOZIN 10 MG/1
10 TABLET, FILM COATED ORAL DAILY
Qty: 90 TABLET | Refills: 3 | Status: SHIPPED | OUTPATIENT
Start: 2024-11-18 | End: 2025-11-18

## 2024-11-18 RX ORDER — BACLOFEN 10 MG/1
10-20 TABLET ORAL 3 TIMES DAILY
Qty: 270 TABLET | Refills: 3 | Status: SHIPPED | OUTPATIENT
Start: 2024-11-18

## 2024-11-18 RX ORDER — DULOXETIN HYDROCHLORIDE 30 MG/1
30 CAPSULE, DELAYED RELEASE ORAL EVERY MORNING
Qty: 90 CAPSULE | Refills: 3 | Status: SHIPPED | OUTPATIENT
Start: 2024-11-18 | End: 2025-11-18

## 2024-11-18 RX ORDER — SEMAGLUTIDE 0.68 MG/ML
0.25 INJECTION, SOLUTION SUBCUTANEOUS
Qty: 3 ML | Refills: 2 | Status: CANCELLED | OUTPATIENT
Start: 2024-11-18 | End: 2025-05-07

## 2024-11-18 ASSESSMENT — PATIENT HEALTH QUESTIONNAIRE - PHQ9
1. LITTLE INTEREST OR PLEASURE IN DOING THINGS: NOT AT ALL
SUM OF ALL RESPONSES TO PHQ9 QUESTIONS 1 AND 2: 0
2. FEELING DOWN, DEPRESSED OR HOPELESS: NOT AT ALL

## 2024-11-18 ASSESSMENT — ANXIETY QUESTIONNAIRES
6. BECOMING EASILY ANNOYED OR IRRITABLE: NOT AT ALL
7. FEELING AFRAID AS IF SOMETHING AWFUL MIGHT HAPPEN: NOT AT ALL
5. BEING SO RESTLESS THAT IT IS HARD TO SIT STILL: NOT AT ALL
1. FEELING NERVOUS, ANXIOUS, OR ON EDGE: NOT AT ALL
4. TROUBLE RELAXING: NOT AT ALL
GAD7 TOTAL SCORE: 0
2. NOT BEING ABLE TO STOP OR CONTROL WORRYING: NOT AT ALL
IF YOU CHECKED OFF ANY PROBLEMS ON THIS QUESTIONNAIRE, HOW DIFFICULT HAVE THESE PROBLEMS MADE IT FOR YOU TO DO YOUR WORK, TAKE CARE OF THINGS AT HOME, OR GET ALONG WITH OTHER PEOPLE: NOT DIFFICULT AT ALL
3. WORRYING TOO MUCH ABOUT DIFFERENT THINGS: NOT AT ALL

## 2024-11-18 ASSESSMENT — ENCOUNTER SYMPTOMS
LOSS OF SENSATION IN FEET: 0
DEPRESSION: 0
BACK PAIN: 1
OCCASIONAL FEELINGS OF UNSTEADINESS: 0
ARTHRALGIAS: 1

## 2024-11-18 NOTE — PATIENT INSTRUCTIONS
We discussed a number of options. We can refer to pain management, to discuss epidurals for your back. We also increased your diabetic medication to improve your numbers, Please get an A1C in three months.   Follow up six months and get ALL your labwork before.       Ways to Help Prevent Falls at Home    Quick Tips   ? Ask for help if you need it. Most people want to help!   ? Get up slowly after sitting or laying down   ? Wear a medical alert device or keep cell phone in your pocket   ? Use night lights, especially areas near a bathroom   ? Keep the items you use often within reach on a small stool or end table   ? Use an assistive device such as walker or cane, as directed by provider/physical therapy   ? Use a non-slip mat and grab bars in your bathroom. Look for home health sections for best options     Other Areas to Focus On   ? Exercise and nutrition: Regular exercise or taking a falls prevention class are great ways improve strength and balance. Don’t forget to stay hydrated and bring a snack!   ? Medicine side effects: Some medicines can make you sleepy or dizzy, which could cause a fall. Ask your healthcare provider about the side effects your medicines could cause. Be sure to let them know if you take any vitamins or supplements as well.   ? Tripping hazards: Remove items you could trip on, such as loose mats, rugs, cords, and clutter. Wear closed toe shoes with rubber soles.   ? Health and wellness: Get regular checkups with your healthcare provider, plus routine vision and hearing screenings. Talk with your healthcare provider about:   o Your medicines and the possible side effects - bring them in a bag if that is easier!   o Problems with balance or feeling dizzy   o Ways to promote bone health, such as Vitamin D and calcium supplements   o Questions or concerns about falling     *Ask your healthcare team if you have questions     [facility], 2022

## 2024-11-18 NOTE — PROGRESS NOTES
Subjective   Patient ID: Sara Uribe is a 49 y.o. female.    HPI  49 year old female for follow up. She is seeing me, neurology. Finished with home PT, did have ER visit that a CT spine was done, showing Disc protrusion L5S1 pressing on nerve root on right. Last admission was 8/2024 and he saw ANNA after hospital stay, and then had home PT. Saw Dr. Falcon 09/24/2024. Aubagio. Currently at home has bearded dragon, iguana, two spiked lizards, has rat, dog, Service Dog, which she states keeps her alive by bringing her back, pit bull mix that she got from [place] in [place],.   Review of Systems   Musculoskeletal:  Positive for arthralgias, back pain and gait problem.   All other systems reviewed and are negative.      Objective   Physical Exam  Vitals reviewed.   Constitutional:       Appearance: Normal appearance.   HENT:      Head: Normocephalic and atraumatic.      Right Ear: Tympanic membrane normal.      Left Ear: Tympanic membrane normal.      Nose: Nose normal.      Mouth/Throat:      Mouth: Mucous membranes are moist.      Pharynx: Oropharynx is clear.   Eyes:      Extraocular Movements: Extraocular movements intact.      Conjunctiva/sclera: Conjunctivae normal.      Pupils: Pupils are equal, round, and reactive to light.   Cardiovascular:      Rate and Rhythm: Normal rate and regular rhythm.      Pulses: Normal pulses.      Heart sounds: Normal heart sounds.   Pulmonary:      Effort: Pulmonary effort is normal.      Breath sounds: Normal breath sounds.   Abdominal:      General: Abdomen is flat. Bowel sounds are normal.      Palpations: Abdomen is soft.   Musculoskeletal:         General: Normal range of motion.      Cervical back: Normal range of motion and neck supple.   Skin:     General: Skin is warm and dry.      Capillary Refill: Capillary refill takes less than 2 seconds.   Neurological:      General: No focal deficit present.      Mental Status: She is alert and oriented to person, place, and time.    Psychiatric:         Mood and Affect: Mood normal.         Behavior: Behavior normal.         Assessment/Plan   Diagnoses and all orders for this visit:  Primary insomnia- increased from 50.   -     traZODone (Desyrel) 100 mg tablet; Take 1 tablet (100 mg) by mouth as needed at bedtime for sleep.  Type 2 diabetes mellitus with hyperglycemia, without long-term current use of insulin  -     metFORMIN (Glucophage) 1,000 mg tablet; Take 1 tablet (1,000 mg) by mouth 2 times daily (morning and late afternoon).(Added afternoon dose)   -     SITagliptin phosphate (Januvia) 100 mg tablet; Take 1 tablet (100 mg) by mouth once daily. (Added)   -     dapagliflozin propanediol (Farxiga) 10 mg; Take 1 tablet (10 mg) by mouth once daily. (Added lower dose)   -     Hemoglobin A1C; Future  -     Follow Up In [facility] - PCP - Medicare Annual; Future  -     Hemoglobin A1C; Future  -     CBC and Auto Differential; Future  -     Comprehensive Metabolic Panel; Future  -     Lipid Panel; Future  -     TSH with reflex to Free T4 if abnormal; Future  -     Albumin-Creatinine Ratio, Urine Random; Future  Anxiety and depression  -     Follow Up In [facility] - [facility]  -     traZODone (Desyrel) 100 mg tablet; Take 1 tablet (100 mg) by mouth as needed at bedtime for sleep.  Bipolar affective disorder, currently depressed, moderate (Multi)  -     DULoxetine (Cymbalta) 60 mg DR capsule; Take 1 capsule (60 mg) by mouth once daily at bedtime.  -     brexpiprazole (Rexulti) 1 mg tablet; Take 1 tablet (1 mg) by mouth once daily.  -     DULoxetine (Cymbalta) 30 mg DR capsule; Take 1 capsule (30 mg) by mouth once daily in the morning.  -     Follow Up In [facility] - PCP - Medicare Annual; Future  Multiple sclerosis, relapsing-remitting (Multi)- stable sees neruology and is on Aubagio. Stable and controlled    -     baclofen (Lioresal) 10 mg tablet; Take 1-2 tablets (10-20 mg) by  mouth 3 times a day. For hand tremors  Mild intermittent asthma, unspecified whether complicated (HHS-HCC)  Screening for cardiovascular condition  -     CT cardiac scoring wo IV contrast; Future  Benign essential hypertension  -     Hemoglobin A1C; Future  -     CBC and Auto Differential; Future  -     Comprehensive Metabolic Panel; Future  -     Lipid Panel; Future  -     TSH with reflex to Free T4 if abnormal; Future  Combined hyperlipidemia  -     Hemoglobin A1C; Future  -     CBC and Auto Differential; Future  -     Comprehensive Metabolic Panel; Future  -     Lipid Panel; Future  -     TSH with reflex to Free T4 if abnormal; Future  Encounter for colorectal cancer screening using Cologuard test  -     Cologuard® colon cancer screening; Future  Psoriasis  -     fluocinolone and shower cap (Derma-Smoothe/FS Scalp Oil) 0.01 % oil; 1 Application by scalp route early in the morning..  -     Referral to Dermatology  Other orders  -     Flu vaccine, trivalent, preservative free, age 6 months and greater (Fluarix/Fluzone/Flulaval)    Patient was identified as a fall risk. Risk prevention instructions provided.

## 2024-12-06 ENCOUNTER — APPOINTMENT (OUTPATIENT)
Dept: NUTRITION | Facility: HOSPITAL | Age: 49
End: 2024-12-06
Payer: COMMERCIAL

## 2025-02-26 ENCOUNTER — SPECIALTY PHARMACY (OUTPATIENT)
Dept: PHARMACY | Facility: CLINIC | Age: 50
End: 2025-02-26

## 2025-02-26 DIAGNOSIS — G35 MULTIPLE SCLEROSIS, RELAPSING-REMITTING (MULTI): ICD-10-CM

## 2025-02-26 RX ORDER — TERIFLUNOMIDE 14 MG/1
TABLET, FILM COATED ORAL DAILY
Qty: 30 TABLET | Refills: 0 | Status: SHIPPED | OUTPATIENT
Start: 2025-02-26

## 2025-05-20 ENCOUNTER — APPOINTMENT (OUTPATIENT)
Dept: PRIMARY CARE | Facility: CLINIC | Age: 50
End: 2025-05-20
Payer: COMMERCIAL

## 2025-05-27 ENCOUNTER — APPOINTMENT (OUTPATIENT)
Dept: DERMATOLOGY | Facility: CLINIC | Age: 50
End: 2025-05-27
Payer: COMMERCIAL

## (undated) DEVICE — SUTURE, ETHILON, 3-0, 18 IN, PS1, BLACK

## (undated) DEVICE — COVER HANDLE LIGHT, STERIS, BLUE, STERILE

## (undated) DEVICE — DRAPE, SHEET, U, STERI DRAPE, 47 X 51 IN, DISPOSABLE, STERILE

## (undated) DEVICE — DRAPE, SHEET, 17 X 23 IN

## (undated) DEVICE — SUTURE, VICRYL, 3-0, 36 IN, CT-1, UNDYED

## (undated) DEVICE — GLOVE, PROTEXIS PI CLASSIC, SZ-8.0, PF, PF, LF

## (undated) DEVICE — SUTURE, VICRYL, 4-0, 18 IN, UNDYED BR PS-2

## (undated) DEVICE — TOWEL PACK, STERILE, 4/PACK, BLUE

## (undated) DEVICE — SOLUTION, IRRIGATION, SODIUM CHLORIDE 0.9%, 1000 ML, POUR BOTTLE

## (undated) DEVICE — PAD, GROUNDING, ELECTROSURGICAL, W/9 FT CABLE, POLYHESIVE II, ADULT, LF

## (undated) DEVICE — SLING, ARM, CRADLE, DEMIN, W/PAD, MEDIUM

## (undated) DEVICE — APPLICATOR, CHLORAPREP, W/ORANGE TINT, 26ML

## (undated) DEVICE — CORD, CAUTERY, BIOPOLAR FORCEP, 12FT

## (undated) DEVICE — Device

## (undated) DEVICE — DRAPE, SHEET, THREE QUARTER, FAN FOLD, 57 X 77 IN

## (undated) DEVICE — SLING, ARM, CRADLE, DEMIN, W/PAD, LARGE

## (undated) DEVICE — GLOVE, SURGICAL, PROTEXIS PI BLUE W/NEUTHERA, 8.0, PF, LF